# Patient Record
Sex: MALE | Race: WHITE | NOT HISPANIC OR LATINO | Employment: FULL TIME | URBAN - METROPOLITAN AREA
[De-identification: names, ages, dates, MRNs, and addresses within clinical notes are randomized per-mention and may not be internally consistent; named-entity substitution may affect disease eponyms.]

---

## 2019-10-30 ENCOUNTER — HOSPITAL ENCOUNTER (OUTPATIENT)
Dept: GENERAL RADIOLOGY | Facility: HOSPITAL | Age: 64
Discharge: HOME | End: 2019-10-30
Attending: FAMILY MEDICINE

## 2019-11-05 ENCOUNTER — HOSPITAL ENCOUNTER (OUTPATIENT)
Dept: CARDIOLOGY | Facility: HOSPITAL | Age: 64
Discharge: HOME | End: 2019-11-05
Attending: FAMILY MEDICINE

## 2019-11-12 ENCOUNTER — OFFICE VISIT CONVERTED (OUTPATIENT)
Dept: SURGERY | Facility: CLINIC | Age: 64
End: 2019-11-12
Attending: SURGERY

## 2019-11-12 ENCOUNTER — CONVERSION ENCOUNTER (OUTPATIENT)
Dept: SURGERY | Facility: CLINIC | Age: 64
End: 2019-11-12

## 2019-11-19 ENCOUNTER — HOSPITAL ENCOUNTER (OUTPATIENT)
Dept: GENERAL RADIOLOGY | Facility: HOSPITAL | Age: 64
Discharge: HOME | End: 2019-11-19
Attending: FAMILY MEDICINE

## 2019-12-03 ENCOUNTER — HOSPITAL ENCOUNTER (OUTPATIENT)
Dept: PHYSICAL THERAPY | Facility: CLINIC | Age: 64
Setting detail: RECURRING SERIES
Discharge: HOME | End: 2020-01-14
Attending: FAMILY MEDICINE

## 2019-12-30 ENCOUNTER — HOSPITAL ENCOUNTER (OUTPATIENT)
Dept: GENERAL RADIOLOGY | Facility: HOSPITAL | Age: 64
Discharge: HOME | End: 2019-12-30
Attending: FAMILY MEDICINE

## 2020-01-10 ENCOUNTER — OFFICE VISIT CONVERTED (OUTPATIENT)
Dept: ORTHOPEDIC SURGERY | Facility: CLINIC | Age: 65
End: 2020-01-10
Attending: ORTHOPAEDIC SURGERY

## 2020-01-29 ENCOUNTER — OFFICE VISIT CONVERTED (OUTPATIENT)
Dept: FAMILY MEDICINE CLINIC | Facility: CLINIC | Age: 65
End: 2020-01-29
Attending: NURSE PRACTITIONER

## 2020-02-06 ENCOUNTER — HOSPITAL ENCOUNTER (OUTPATIENT)
Dept: LAB | Facility: HOSPITAL | Age: 65
Discharge: HOME | End: 2020-02-06
Attending: NURSE PRACTITIONER

## 2020-02-06 LAB
ALBUMIN SERPL-MCNC: 4.2 G/DL (ref 3.5–5)
ALBUMIN/GLOB SERPL: 1.6 {RATIO} (ref 1.4–2.6)
ALP SERPL-CCNC: 85 U/L (ref 56–155)
ALT SERPL-CCNC: 37 U/L (ref 10–40)
ANION GAP SERPL CALC-SCNC: 19 MMOL/L (ref 8–19)
AST SERPL-CCNC: 26 U/L (ref 15–50)
BASOPHILS # BLD AUTO: 0.07 10*3/UL (ref 0–0.2)
BASOPHILS NFR BLD AUTO: 0.7 % (ref 0–3)
BILIRUB SERPL-MCNC: 0.63 MG/DL (ref 0.2–1.3)
BUN SERPL-MCNC: 15 MG/DL (ref 5–25)
BUN/CREAT SERPL: 15 {RATIO} (ref 6–20)
CALCIUM SERPL-MCNC: 9.8 MG/DL (ref 8.7–10.4)
CHLORIDE SERPL-SCNC: 100 MMOL/L (ref 99–111)
CONV ABS IMM GRAN: 0.04 10*3/UL (ref 0–0.2)
CONV CO2: 22 MMOL/L (ref 22–32)
CONV IMMATURE GRAN: 0.4 % (ref 0–1.8)
CONV TOTAL PROTEIN: 6.9 G/DL (ref 6.3–8.2)
CREAT UR-MCNC: 1.01 MG/DL (ref 0.7–1.2)
DEPRECATED RDW RBC AUTO: 40.2 FL (ref 35.1–43.9)
EOSINOPHIL # BLD AUTO: 0.37 10*3/UL (ref 0–0.7)
EOSINOPHIL # BLD AUTO: 3.5 % (ref 0–7)
ERYTHROCYTE [DISTWIDTH] IN BLOOD BY AUTOMATED COUNT: 12.3 % (ref 11.6–14.4)
GFR SERPLBLD BASED ON 1.73 SQ M-ARVRAT: >60 ML/MIN/{1.73_M2}
GLOBULIN UR ELPH-MCNC: 2.7 G/DL (ref 2–3.5)
GLUCOSE SERPL-MCNC: 156 MG/DL (ref 70–99)
HCT VFR BLD AUTO: 48.4 % (ref 42–52)
HGB BLD-MCNC: 16.4 G/DL (ref 14–18)
LYMPHOCYTES # BLD AUTO: 1.79 10*3/UL (ref 1–5)
LYMPHOCYTES NFR BLD AUTO: 17.1 % (ref 20–45)
MAGNESIUM SERPL-MCNC: 1.84 MG/DL (ref 1.6–2.3)
MCH RBC QN AUTO: 30 PG (ref 27–31)
MCHC RBC AUTO-ENTMCNC: 33.9 G/DL (ref 33–37)
MCV RBC AUTO: 88.5 FL (ref 80–96)
MONOCYTES # BLD AUTO: 1.08 10*3/UL (ref 0.2–1.2)
MONOCYTES NFR BLD AUTO: 10.3 % (ref 3–10)
NEUTROPHILS # BLD AUTO: 7.09 10*3/UL (ref 2–8)
NEUTROPHILS NFR BLD AUTO: 68 % (ref 30–85)
NRBC CBCN: 0 % (ref 0–0.7)
OSMOLALITY SERPL CALC.SUM OF ELEC: 288 MOSM/KG (ref 273–304)
PLATELET # BLD AUTO: 245 10*3/UL (ref 130–400)
PMV BLD AUTO: 11.3 FL (ref 9.4–12.4)
POTASSIUM SERPL-SCNC: 4.1 MMOL/L (ref 3.5–5.3)
RBC # BLD AUTO: 5.47 10*6/UL (ref 4.7–6.1)
SODIUM SERPL-SCNC: 137 MMOL/L (ref 135–147)
TESTOST SERPL-MCNC: 424 NG/DL (ref 193–740)
WBC # BLD AUTO: 10.44 10*3/UL (ref 4.8–10.8)

## 2020-02-11 LAB — CONV VITAMIN B6, TOTAL PLASMA: 8.8 UG/L (ref 5.3–46.7)

## 2020-02-17 ENCOUNTER — HOSPITAL ENCOUNTER (OUTPATIENT)
Dept: PERIOP | Facility: HOSPITAL | Age: 65
Setting detail: HOSPITAL OUTPATIENT SURGERY
Discharge: HOME | End: 2020-02-17
Attending: ORTHOPAEDIC SURGERY

## 2020-02-17 LAB
ANION GAP SERPL CALC-SCNC: 18 MMOL/L (ref 8–19)
BUN SERPL-MCNC: 19 MG/DL (ref 5–25)
BUN/CREAT SERPL: 18 {RATIO} (ref 6–20)
CALCIUM SERPL-MCNC: 9.8 MG/DL (ref 8.7–10.4)
CHLORIDE SERPL-SCNC: 100 MMOL/L (ref 99–111)
CONV CO2: 22 MMOL/L (ref 22–32)
CREAT UR-MCNC: 1.08 MG/DL (ref 0.7–1.2)
GFR SERPLBLD BASED ON 1.73 SQ M-ARVRAT: >60 ML/MIN/{1.73_M2}
GLUCOSE SERPL-MCNC: 159 MG/DL (ref 70–99)
OSMOLALITY SERPL CALC.SUM OF ELEC: 288 MOSM/KG (ref 273–304)
POTASSIUM SERPL-SCNC: 4.1 MMOL/L (ref 3.5–5.3)
SODIUM SERPL-SCNC: 136 MMOL/L (ref 135–147)

## 2020-03-03 ENCOUNTER — OFFICE VISIT CONVERTED (OUTPATIENT)
Dept: ORTHOPEDIC SURGERY | Facility: CLINIC | Age: 65
End: 2020-03-03
Attending: ORTHOPAEDIC SURGERY

## 2020-03-04 ENCOUNTER — HOSPITAL ENCOUNTER (OUTPATIENT)
Dept: PHYSICAL THERAPY | Facility: CLINIC | Age: 65
Setting detail: RECURRING SERIES
Discharge: HOME | End: 2020-05-01
Attending: ORTHOPAEDIC SURGERY

## 2020-04-03 ENCOUNTER — CONVERSION ENCOUNTER (OUTPATIENT)
Dept: ORTHOPEDIC SURGERY | Facility: CLINIC | Age: 65
End: 2020-04-03

## 2020-04-03 ENCOUNTER — TELEMEDICINE CONVERTED (OUTPATIENT)
Dept: ORTHOPEDIC SURGERY | Facility: CLINIC | Age: 65
End: 2020-04-03
Attending: ORTHOPAEDIC SURGERY

## 2020-04-04 ENCOUNTER — HOSPITAL ENCOUNTER (OUTPATIENT)
Dept: URGENT CARE | Facility: CLINIC | Age: 65
Discharge: HOME | End: 2020-04-04
Attending: FAMILY MEDICINE

## 2020-04-06 LAB — BACTERIA SPEC AEROBE CULT: NORMAL

## 2020-07-21 ENCOUNTER — OFFICE VISIT CONVERTED (OUTPATIENT)
Dept: SURGERY | Facility: CLINIC | Age: 65
End: 2020-07-21
Attending: SURGERY

## 2020-07-29 ENCOUNTER — OFFICE VISIT CONVERTED (OUTPATIENT)
Dept: FAMILY MEDICINE CLINIC | Facility: CLINIC | Age: 65
End: 2020-07-29
Attending: NURSE PRACTITIONER

## 2020-08-07 ENCOUNTER — HOSPITAL ENCOUNTER (OUTPATIENT)
Dept: PREADMISSION TESTING | Facility: HOSPITAL | Age: 65
Discharge: HOME | End: 2020-08-07
Attending: SURGERY

## 2020-08-08 LAB — SARS-COV-2 RNA SPEC QL NAA+PROBE: NOT DETECTED

## 2020-08-12 ENCOUNTER — HOSPITAL ENCOUNTER (OUTPATIENT)
Dept: PERIOP | Facility: HOSPITAL | Age: 65
Setting detail: HOSPITAL OUTPATIENT SURGERY
Discharge: HOME | End: 2020-08-12
Attending: SURGERY

## 2020-08-12 LAB
ANION GAP SERPL CALC-SCNC: 16 MMOL/L (ref 8–19)
BUN SERPL-MCNC: 15 MG/DL (ref 5–25)
BUN/CREAT SERPL: 13 {RATIO} (ref 6–20)
CALCIUM SERPL-MCNC: 10.8 MG/DL (ref 8.7–10.4)
CHLORIDE SERPL-SCNC: 100 MMOL/L (ref 99–111)
CONV CO2: 27 MMOL/L (ref 22–32)
CREAT UR-MCNC: 1.13 MG/DL (ref 0.7–1.2)
GFR SERPLBLD BASED ON 1.73 SQ M-ARVRAT: >60 ML/MIN/{1.73_M2}
GLUCOSE SERPL-MCNC: 183 MG/DL (ref 70–99)
OSMOLALITY SERPL CALC.SUM OF ELEC: 292 MOSM/KG (ref 273–304)
POTASSIUM SERPL-SCNC: 4.8 MMOL/L (ref 3.5–5.3)
SODIUM SERPL-SCNC: 138 MMOL/L (ref 135–147)

## 2020-08-14 ENCOUNTER — HOSPITAL ENCOUNTER (OUTPATIENT)
Dept: URGENT CARE | Facility: CLINIC | Age: 65
Discharge: HOME | End: 2020-08-14

## 2020-08-19 ENCOUNTER — HOSPITAL ENCOUNTER (OUTPATIENT)
Dept: URGENT CARE | Facility: CLINIC | Age: 65
Discharge: HOME | End: 2020-08-19
Attending: INTERNAL MEDICINE

## 2020-08-19 LAB — SARS-COV-2 RNA SPEC QL NAA+PROBE: NORMAL

## 2020-08-22 LAB — SARS-COV-2 RNA SPEC QL NAA+PROBE: NOT DETECTED

## 2020-08-25 ENCOUNTER — OFFICE VISIT CONVERTED (OUTPATIENT)
Dept: SURGERY | Facility: CLINIC | Age: 65
End: 2020-08-25
Attending: SURGERY

## 2020-08-27 ENCOUNTER — HOSPITAL ENCOUNTER (OUTPATIENT)
Dept: CARDIOLOGY | Facility: HOSPITAL | Age: 65
Discharge: HOME | End: 2020-08-27
Attending: SURGERY

## 2020-09-16 ENCOUNTER — HOSPITAL ENCOUNTER (OUTPATIENT)
Dept: CARDIOLOGY | Facility: HOSPITAL | Age: 65
Discharge: HOME | End: 2020-09-16
Attending: SURGERY

## 2020-10-02 ENCOUNTER — CONVERSION ENCOUNTER (OUTPATIENT)
Dept: OTHER | Facility: HOSPITAL | Age: 65
End: 2020-10-02

## 2020-10-02 ENCOUNTER — OFFICE VISIT CONVERTED (OUTPATIENT)
Dept: CARDIOLOGY | Facility: CLINIC | Age: 65
End: 2020-10-02
Attending: INTERNAL MEDICINE

## 2020-10-07 ENCOUNTER — CONVERSION ENCOUNTER (OUTPATIENT)
Dept: CARDIOLOGY | Facility: CLINIC | Age: 65
End: 2020-10-07
Attending: INTERNAL MEDICINE

## 2020-10-16 ENCOUNTER — OFFICE VISIT CONVERTED (OUTPATIENT)
Dept: SURGERY | Facility: CLINIC | Age: 65
End: 2020-10-16
Attending: SURGERY

## 2020-12-21 ENCOUNTER — TRANSCRIBE ORDERS (OUTPATIENT)
Dept: ADMINISTRATIVE | Facility: HOSPITAL | Age: 65
End: 2020-12-21

## 2020-12-21 DIAGNOSIS — I89.0 LYMPHEDEMA: Primary | ICD-10-CM

## 2021-01-08 ENCOUNTER — HOSPITAL ENCOUNTER (OUTPATIENT)
Dept: CT IMAGING | Facility: HOSPITAL | Age: 66
Discharge: HOME | End: 2021-01-08
Admitting: SURGERY

## 2021-01-08 DIAGNOSIS — I89.0 LYMPHEDEMA: ICD-10-CM

## 2021-01-08 PROCEDURE — 74176 CT ABD & PELVIS W/O CONTRAST: CPT

## 2021-02-01 ENCOUNTER — HOSPITAL ENCOUNTER (OUTPATIENT)
Dept: LAB | Facility: HOSPITAL | Age: 66
Discharge: HOME | End: 2021-02-01
Attending: NURSE PRACTITIONER

## 2021-02-01 ENCOUNTER — OFFICE VISIT CONVERTED (OUTPATIENT)
Dept: FAMILY MEDICINE CLINIC | Facility: CLINIC | Age: 66
End: 2021-02-01
Attending: NURSE PRACTITIONER

## 2021-02-01 LAB
ALBUMIN SERPL-MCNC: 4.3 G/DL (ref 3.5–5)
ALBUMIN/GLOB SERPL: 1.5 {RATIO} (ref 1.4–2.6)
ALP SERPL-CCNC: 112 U/L (ref 56–155)
ALT SERPL-CCNC: 47 U/L (ref 10–40)
ANION GAP SERPL CALC-SCNC: 23 MMOL/L (ref 8–19)
AST SERPL-CCNC: 31 U/L (ref 15–50)
BASOPHILS # BLD AUTO: 0.08 10*3/UL (ref 0–0.2)
BASOPHILS NFR BLD AUTO: 1.5 % (ref 0–3)
BILIRUB SERPL-MCNC: 0.56 MG/DL (ref 0.2–1.3)
BUN SERPL-MCNC: 20 MG/DL (ref 5–25)
BUN/CREAT SERPL: 17 {RATIO} (ref 6–20)
CALCIUM SERPL-MCNC: 10.1 MG/DL (ref 8.7–10.4)
CHLORIDE SERPL-SCNC: 97 MMOL/L (ref 99–111)
CHOLEST SERPL-MCNC: 193 MG/DL (ref 107–200)
CHOLEST/HDLC SERPL: 3.6 {RATIO} (ref 3–6)
CONV ABS IMM GRAN: 0.02 10*3/UL (ref 0–0.2)
CONV CO2: 18 MMOL/L (ref 22–32)
CONV IMMATURE GRAN: 0.4 % (ref 0–1.8)
CONV TOTAL PROTEIN: 7.2 G/DL (ref 6.3–8.2)
CREAT UR-MCNC: 1.16 MG/DL (ref 0.7–1.2)
DEPRECATED RDW RBC AUTO: 39.3 FL (ref 35.1–43.9)
EOSINOPHIL # BLD AUTO: 0.15 10*3/UL (ref 0–0.7)
EOSINOPHIL # BLD AUTO: 2.7 % (ref 0–7)
ERYTHROCYTE [DISTWIDTH] IN BLOOD BY AUTOMATED COUNT: 12.4 % (ref 11.6–14.4)
EST. AVERAGE GLUCOSE BLD GHB EST-MCNC: 194 MG/DL
GFR SERPLBLD BASED ON 1.73 SQ M-ARVRAT: >60 ML/MIN/{1.73_M2}
GLOBULIN UR ELPH-MCNC: 2.9 G/DL (ref 2–3.5)
GLUCOSE SERPL-MCNC: 345 MG/DL (ref 70–99)
HBA1C MFR BLD: 8.4 % (ref 3.5–5.7)
HCT VFR BLD AUTO: 45.4 % (ref 42–52)
HDLC SERPL-MCNC: 53 MG/DL (ref 40–60)
HGB BLD-MCNC: 15.7 G/DL (ref 14–18)
LDLC SERPL CALC-MCNC: 88 MG/DL (ref 70–100)
LYMPHOCYTES # BLD AUTO: 1.22 10*3/UL (ref 1–5)
LYMPHOCYTES NFR BLD AUTO: 22.3 % (ref 20–45)
MCH RBC QN AUTO: 30.1 PG (ref 27–31)
MCHC RBC AUTO-ENTMCNC: 34.6 G/DL (ref 33–37)
MCV RBC AUTO: 87 FL (ref 80–96)
MONOCYTES # BLD AUTO: 0.7 10*3/UL (ref 0.2–1.2)
MONOCYTES NFR BLD AUTO: 12.8 % (ref 3–10)
NEUTROPHILS # BLD AUTO: 3.3 10*3/UL (ref 2–8)
NEUTROPHILS NFR BLD AUTO: 60.3 % (ref 30–85)
NRBC CBCN: 0 % (ref 0–0.7)
OSMOLALITY SERPL CALC.SUM OF ELEC: 294 MOSM/KG (ref 273–304)
PLATELET # BLD AUTO: 233 10*3/UL (ref 130–400)
PMV BLD AUTO: 11.1 FL (ref 9.4–12.4)
POTASSIUM SERPL-SCNC: 4.2 MMOL/L (ref 3.5–5.3)
PSA SERPL-MCNC: 2.59 NG/ML (ref 0–4)
RBC # BLD AUTO: 5.22 10*6/UL (ref 4.7–6.1)
SODIUM SERPL-SCNC: 134 MMOL/L (ref 135–147)
TESTOST SERPL-MCNC: 110 NG/DL (ref 193–740)
TRIGL SERPL-MCNC: 260 MG/DL (ref 40–150)
TSH SERPL-ACNC: 4.06 M[IU]/L (ref 0.27–4.2)
VLDLC SERPL-MCNC: 52 MG/DL (ref 5–37)
WBC # BLD AUTO: 5.47 10*3/UL (ref 4.8–10.8)

## 2021-02-05 ENCOUNTER — TELEMEDICINE CONVERTED (OUTPATIENT)
Dept: FAMILY MEDICINE CLINIC | Facility: CLINIC | Age: 66
End: 2021-02-05
Attending: NURSE PRACTITIONER

## 2021-05-10 NOTE — H&P
History and Physical      Patient Name: Rik Zayas   Patient ID: [id number]   Sex: Male   YOB: 1955    Primary Care Provider: Carlos ADAM    Visit Date: October 2, 2020    Provider: Huey Pereyra MD   Location: [facility]   Location Address: [address] [place] [address]  [id number]          History Of Present Illness  Consult requested by: Cralos ADAM   I saw Rik Zayas in the office today. This is a 65 year old, /White male. He has no previous cardiac history. He has developed, over the last 2 to 3 years, claudication symptoms in both of his legs initially starting as fatigue and cramping in his gluteal areas and hamstrings specifically related to physical exertion and improved with rest. Over the past 6 to 8 months, his symptoms have become more involving the lower extremities in the calves. He has associated edema. Symptoms are worse with exertion and improved with rest, but at times he has cramping and rest pain at night as well. He has seen Dr. Rai here locally for evaluation for arterial insufficiency. The patient has had an BRENDA which is normal. He has had exercise arterial Dopplers, which also are normal. Due to his lower-extremity edema, he has been referred for further evaluation. The patient denies chest pain. He denies excessive shortness of breath or palpitations. He has been on stable medications. He tried Trental for a while, but this did not help his claudication symptoms.   PAST MEDICAL HISTORY: Obesity; hypertension; claudication; arthritis. PAST SURGICAL HISTORY: Knee surgery; hernia repair.   PSYCHOSOCIAL HISTORY: The patient quit smoking 10 years ago. Moderate alcohol intake. Daily caffeine intake. He is .   FAMILY HISTORY: Positive for diabetes and hypertension.   CURRENT MEDICATIONS: include Lisinopril 40 mg daily; HCTZ 25 mg daily; Amlodipine 10 mg daily; testosterone; ibuprofen 400 mg every 4 hours.  "The dosage and frequency of the medications were reviewed with the patient.   ALLERGIES: Sulfa.       Review of Systems  · Constitutional  o Admits  o : good general health lately  o Denies  o : fatigue, recent weight changes   · Eyes  o Denies  o : double vision  · HENT  o Denies  o : hearing loss or ringing, chronic sinus problem, swollen glands in neck  · Cardiovascular  o Admits  o : claudication  o Denies  o : chest pain, palpitations (fast, fluttering, or skipping beats), swelling (feet, ankles, hands), shortness of breath while walking or lying flat  · Respiratory  o Denies  o : chronic or frequent cough, asthma or wheezing, COPD  · Gastrointestinal  o Denies  o : ulcers, nausea or vomiting  · Neurologic  o Denies  o : lightheaded or dizzy, stroke, headaches  · Musculoskeletal  o Admits  o : joint pain, back pain  · Endocrine  o Denies  o : thyroid disease, diabetes, heat or cold intolerance, excessive thirst or urination  · Heme-Lymph  o Denies  o : bleeding or bruising tendency, anemia      Vitals  Date Time BP Position Site L\R Cuff Size HR RR TEMP (F) WT  HT  BMI kg/m2 BSA m2 O2 Sat FR L/min FiO2 HC       10/02/2020 01:16 /94 Sitting    83 - R   241lbs 0oz 6'  1\" 31.8 2.37       10/02/2020 01:16 /93 Sitting                       Physical Examination  · Constitutional  o Appearance  o : Normal, white male, pleasant, in no acute distress.  · Head and Face  o HEENT  o : No pallor, anicteric. Eyes normal. Moist mucous membranes.  · Neck  o Inspection/Palpation  o : Supple. No hepatosplenomegaly.  o Jugular Veins  o : No JVD. No carotid bruits.  · Respiratory  o Auscultation of Lungs  o : Clear to auscultation bilaterally. No crackles or wheezing.  · Cardiovascular  o Heart  o : S1, S2 is normally heard. No S3. No murmur, rubs, or gallops.  · Gastrointestinal  o Abdominal Examination  o : Soft, non-distended. No palpable hepatosplenomegaly. Bowel sounds heard in all four " quadrants.  · Musculoskeletal  o General  o : Normal muscle tone and strength.  · Skin and Subcutaneous Tissue  o General Inspection  o : No skin rashes.  · Extremities  o Extremities  o : Warm and well perfused. Distal pulses present. 2+ pitting edema of the lower extremities. His pedal pulses are normal.     EKG was performed in the office today.  Indication:       claudication.  Results:          sinus rhythm, normal axis and intervals, normal EKG.    His most recent labs showed normal renal function; glucose 183.  His lipid panel shows TRG of 378, , LDL 84, HDL 76.      I reviewed his vascular studies.  He has normal resting and exercise ABIs and normal arterial Dopplers.             Assessment     1.  Classic claudication symptoms over the past 2 to 3 years, progressive, worse with physical exertion, with       some resting pain - Basic evaluation for arterial insufficiency was negative with normal BRENDA, normal exercise       BRENDA and arterial Dopplers.  2.  Lower-extremity edema.  3.  Hypertension - Stable.  4.  Obesity.       Plan     Based on the patient's initial vascular evaluation, it is possible he is having venous claudication.  There does  not seem to be evidence of significant arterial insufficiency.  I think the patient needs a more dedicated   workup, including specific imaging for possible large-vein obstruction that would lead to venous claudication.     An echocardiogram will be scheduled to evaluate for any signs of right heart failure or cardiac causes of high venous pressure.  I recommend he see a vascular specialist with particular expertise in vein-related issues.  I am referring him to Dr. Diane James with [facility].  I will defer choice of imaging modality to her expertise.  I have discussed this with the patient.  He is agreeable and is happy with this referral.  For now, I have kept his medications the same.  We will call him with echocardiogram results.  I will see him  back in a few months as well for follow-up.    It is a pleasure to assist in his care.   Please let me know if you have any questions regarding his case.    Sincerely,       MARLEN Pereyra M.D.  muriel/cornelia           This note was transcribed by Elza Molina.  dmd/cbd  The above service was transcribed by Elza Molina, and I attest to the accuracy of the note.  CBD.             Electronically Signed by: Elza Molina-, -Author on October 6, 2020 11:06:45 AM  Electronically Co-signed by: Huey Pereyra MD -Reviewer on October 6, 2020 12:04:30 PM

## 2021-05-10 NOTE — H&P
"   History and Physical      Patient Name: Rik Zayas   Patient ID: [id number]   Sex: Male   YOB: 1955    Primary Care Provider: Antonio Huntley MD   Referring Provider: Antonio Huntley MD    Visit Date: July 21, 2020    Provider: Diaz Biswas MD   Location: [facility]   Location Address: [address] [place] [address]  [id number]   Location Phone: [phone]          Chief Complaint  · Outpatient History & Physical / Surgical Orders  · Hernia Consult      History Of Present Illness     Rik came in today for evaluation. He is a very nice gentleman that we have met in the past. He has an umbilical hernia that is a bit more symptomatic now. He would like to go ahead and have that repaired if at all possible.       Past Medical History  High blood pressure; Hypertension; Limb Swelling; Lumbar Spinal Stenosis         Past Surgical History  Colonoscopy; Hernia; Hernia Repair         Medication List  amlodipine 10 mg oral tablet; hydrochlorothiazide 25 mg oral tablet; lisinopril 40 mg oral tablet; pentoxifylline 400 mg oral tablet extended release; testosterone cypionate 200 mg/mL intramuscular oil         Allergy List  SULFA (SULFONAMIDES)       Allergies Reconciled  Family Medical History  Diabetes, unspecified type; -Father's Family History Unknown; -Mother's Family History Unknown         Social History  Alcohol (Current some day); Alcohol Use (Current some day); Caffeine (Current every day); lives with children; lives with spouse; .; Recreational Drug Use (Never); Second hand smoke exposure (Never); Tobacco (Never); Working         Vitals  Date Time BP Position Site L\R Cuff Size HR RR TEMP (F) WT  HT  BMI kg/m2 BSA m2 O2 Sat        07/21/2020 01:27 PM       16  240lbs 0oz 6'  1\" 31.66 2.37           Physical Examination  · Constitutional  o Appearance  o : well-nourished, well developed, alert, in no acute distress  · Head and Face  o Head  o :   § Inspection  § : " atraumatic, normocephalic  · Neck  o Inspection/Palpation  o : supple, normal range of motion  · Respiratory  o Inspection of Chest  o : normal inspection  o Auscultation of Lungs  o : breath sounds normal, no distress, clear to ascultate bilaterally  · Cardiovascular  o Heart  o :   § Auscultation of Heart  § : regular rate and rhythm, no murmur, gallop or rub  · Gastrointestinal  o Abdominal Examination  o : normal bowel sounds, non-tender, soft. Umbilical hernia noted.          Assessment  · Pre-Surgical Orders     V72.84  · Hernia, Umibilical     553.1      Plan  · Orders  o Surgery Order (GENOR) - 553.1 - 08/12/2020  o [facility] Pre-Op Covid-19 Screening (29826) - 553.1 - 08/07/2020  · Medications  o Medications have been Reconciled  o Transition of Care or Provider Policy  · Instructions  o PLAN:  o Handouts Provided-Pre-Procedure Instructions including date and time and location of procedure.  o Surgical Facility: [facility]  o ****Surgical Orders****  o ****Patient Status****  o Outpatient  o RISK AND BENEFITS:  o Consent for surgery: Given these options, the patient has verbally expressed an understanding of the risks of surgery and finds these risks acceptable. We will proceed with surgery as soon as possible.  o Consult Anesthesia for any post-operative block, or any pain management procedure deemed necessary by the anestesiologist for adequate post-operative pain control.   o O.R. PREP: Per protocol  o SCD's preoperatively  o PLEASE SIGN PERMIT FOR: Robotic umbilical hernia repair  o *__Kefzol 2 gram IV on call to OR.  o *___The above History and Physical Examination has been completed within 30 days of admission.  o Electronically Identified Patient Education Materials Provided Electronically     We will set him up for a robotic umbilical hernia repair. I have described the procedure to him as well as the risks and benefits and he is agreeable to proceeding.             Electronically Signed by:  Yuliana Osullivan-, -Author on July 22, 2020 10:05:29 AM  Electronically Co-signed by: Diaz Biswas MD -Reviewer on July 27, 2020 02:34:26 PM

## 2021-05-10 NOTE — PROCEDURES
"   Procedure Note      Patient Name: Rik Zayas   Patient ID: [id number]   Sex: Male   YOB: 1955    Primary Care Provider: Carlos ADAM    Visit Date: October 7, 2020    Provider: Huey Pereyra MD   Location: [facility]   Location Address: [address] [place] [address], [address]   [place]  [id number]   Location Phone: [phone]          FINAL REPORT   TRANSTHORACIC ECHOCARDIOGRAM REPORT    Diagnosis: Edema   Height: 6'1\" Weight: 261 B/P: 142/94 BSA: 2.3   Tech: JTW   MEASUREMENTS:  RVID (Diastole) : RVID. (NORMAL: 0.7 to 2.4 cm max)   LVID (Systole): 3.0 cm (Diastole): 4.2 cm (NORMAL: 3.7 - 5.4 cm)   Posterior Wall Thickness (Diastole): 1.0 cm (NORMAL: 0.8 - 1.1 cm)   Septal Thickness (Diastole): 1.0 cm (NORMAL: 0.7 - 1.2 cm)   LAID (Systole): 2.8 cm (NORMAL: 1.9 - 3.8 cm)   Aortic Root Diameter (Diastole): 3.8 cm (NORMAL: 2.0 - 3.7 cm)   COMMENTS:  The patient underwent 2-D, M-Mode, and Doppler examination, including pulse-wave, continuous-wave, and color-flow analysis; the study is technically adequate.   FINDINGS:  MITRAL VALVE: Normal in structure and function.   AORTIC VALVE: Mild sclerotic changes. There is no aortic stenosis. There is trace aortic regurgitation, eccentric jet.   TRICUSPID VALVE: Normal in structure and function.   PULMONIC VALVE: Not well visualized.   AORTIC ROOT: Mildly enlarged at 3.8 to 4.0 cm.   LEFT ATRIUM: Normal in size. LA volume index is 12 mL/m2.   LEFT VENTRICLE: Normal in size. Wall thickness is normal. Wall motion is low-normal globally. Ejection fraction is 50-55%. Diastolic function measurements suggest impaired relaxation, diastolic dysfunction.   RIGHT VENTRICLE: Normal in size and function.   RIGHT ATRIUM: Normal in size.   PERICARDIUM: Normal. No effusion.   INFERIOR VENA CAVA: Normal.   DOPPLER: E/A ratio is 0.7. DT= 194 msec. IVRT is 77 msec. E/E' is 9.   Faxed: 10/14/2020      CONCLUSION:    1.  Low-normal left ventricular systolic " function, ejection fraction 50-55%.  2.  Mild aortic valve sclerosis with trace aortic regurgitation.        MARLEN Pereyra MD  CBD:vm                 Electronically Signed by: Sola De La O-, Other -Author on October 14, 2020 01:03:58 PM  Electronically Co-signed by: Huey Pereyra MD -Reviewer on October 15, 2020 09:28:09 AM

## 2021-05-12 NOTE — PROGRESS NOTES
"   Quick Note      Patient Name: Rik Zayas   Patient ID: [id number]   Sex: Male   YOB: 1955    Primary Care Provider: Antonio Huntley MD   Referring Provider: Antonio Huntley MD    Visit Date: April 3, 2020    Provider: Reji Platt MD   Location: [facility]   Location Address: [address] [place] [address]  [id number]   Location Phone: [phone]          History Of Present Illness  TELEHEALTH VISIT  Chief Complaint: left knee pain   Rik aZyas is a 64 year old /White male who is presenting for evaluation via telehealth visit. Verbal consent obtained before beginning visit.   Provider spent 5 minutes with the patient during telehealth visit.   The following staff were present during this visit: Dr. Reji Platt   Past Medical History/Overview of Patient Symptoms     HPI: This is a Telehealth visit via telephone. He's overall doing well. He's having no major issues. He's returned to work. He just has some puffiness and soreness along the arthroscopy scars, but it's not requiring any pain medication. He went down the steps about a week ago and had some soreness afterwards, but this has resolved. He's overall happy with the knee. He has good range of motion and has been working on some strength.    DIAGNOSIS: S/P Left Knee Arthroscopy     PLAN: We discussed that it sounds like he's on track for recovery after knee arthroscopy. We recommend to continue activities as tolerated. Plan for follow-up in 6 weeks if needed. If symptoms worsen, he will call.       Vitals  Date Time BP Position Site L\R Cuff Size HR RR TEMP (F) WT  HT  BMI kg/m2 BSA m2 O2 Sat HC       04/03/2020 11:13 AM       16  240lbs 0oz 6'  1\" 31.66 2.37               Plan  · Instructions  o Plan Of Care:             Electronically Signed by: Angie Delgadillo - , Other -Author on April 3, 2020 02:50:18 PM  Electronically Co-signed by: Reji Platt MD -Reviewer on April 5, 2020 " 08:17:38 PM

## 2021-05-13 NOTE — PROGRESS NOTES
Progress Note      Patient Name: Rik Zayas   Patient ID: [id number]   Sex: Male   YOB: 1955    Primary Care Provider: Antonio Huntley MD   Referring Provider: Antonio Huntley MD    Visit Date: July 29, 2020    Provider: JAIR Perla   Location: [facility]   Location Address: [address] [place] [address], [address]  [id number]   Location Phone: [phone]          Chief Complaint     6 month follow up for HTN    Pentoxifylline is not helping with his legs  Requesting a walking restriction work note       History Of Present Illness  Rik Zayas is a 65 year old /White male who presents for evaluation and treatment of:      Presents to the office today for 6-month follow-up regarding his hypertension.  Blood pressure on arrival is 134/82.  He denies any chest pain shortness breath palpitations this time.  Patient states that his legs continue to cramp throughout the night.  He does state that he would like a referral to vascular as medications are not helping.  He did have an BRENDA completed a few months ago which was 1.3.    Patient is scheduled to have hernia surgery next month.  He does state that he needs refills on all his medications at this time.       Past Medical History  Disease Name Date Onset Notes   High blood pressure --  --    Hypertension --  --    Limb Swelling --  --    Lumbar Spinal Stenosis --  --          Past Surgical History  Procedure Name Date Notes   Colonoscopy --  --    Hernia --  --    Hernia Repair --  --          Medication List  Name Date Started Instructions   amlodipine 10 mg oral tablet 07/29/2020 take 1 tablet (10 mg) by oral route once daily   hydrochlorothiazide 25 mg oral tablet 07/29/2020 take 1 tablet (25 mg) by oral route once daily   lisinopril 40 mg oral tablet 07/29/2020 take 1 tablet (40 mg) by oral route once daily   testosterone cypionate 200 mg/mL intramuscular oil 07/29/2020 inject 0.25 milliliter by intramuscular  route daily         Allergy List  Allergen Name Date Reaction Notes   SULFA (SULFONAMIDES) --  --  --          Family Medical History  Disease Name Relative/Age Notes   Diabetes, unspecified type Father/  Mother/  Sister/   Mother; Father; Sister  Mother; Father   -Father's Family History Unknown Father/   Father   -Mother's Family History Unknown Mother/   Mother         Social History  Finding Status Start/Stop Quantity Notes   Alcohol Current some day --/-- --  drinks weekly; liquor   Alcohol Use Current some day --/-- --  drinks weekly, 1 drink per day, has been drinking for 31 or more years   Caffeine Current every day --/-- --  drinks regularly; coffee; 1-2 times per day   lives with children --  --/-- --  --    lives with spouse --  --/-- --  --    . --  --/-- --  --    Recreational Drug Use Never --/-- --  no   Second hand smoke exposure Never --/-- --  no   Tobacco Never --/-- --  never smoker  never a smoker   Working --  --/-- --  --          Review of Systems  · Constitutional  o Denies  o : fatigue, night sweats  · Eyes  o Denies  o : double vision, blurred vision  · HENT  o Denies  o : vertigo, recent head injury  · Breasts  o Denies  o : abnormal changes in breast size, additional breast symptoms except as noted in the HPI  · Cardiovascular  o Denies  o : chest pain, irregular heart beats  · Respiratory  o Denies  o : shortness of breath, productive cough  · Gastrointestinal  o Denies  o : nausea, vomiting  · Genitourinary  o Denies  o : dysuria, urinary retention  · Integument  o Denies  o : hair growth change, new skin lesions  · Neurologic  o Denies  o : altered mental status, seizures  · Musculoskeletal  o Denies  o : joint swelling, limitation of motion  · Endocrine  o Denies  o : cold intolerance, heat intolerance  · Heme-Lymph  o Denies  o : petechiae, lymph node enlargement or tenderness  · Allergic-Immunologic  o Denies  o : frequent illnesses      Vitals  Date Time BP Position Site  "L\R Cuff Size HR RR TEMP (F) WT  HT  BMI kg/m2 BSA m2 O2 Sat        07/29/2020 08:19 /82 Sitting    92 - R 18 97.5 238lbs 0oz 6'  1\" 31.4 2.36 98 %          Physical Examination  · Constitutional  o Appearance  o : well-nourished, in no acute distress  · Neck  o Inspection/Palpation  o : normal appearance, no masses or tenderness, trachea midline  o Range of Motion  o : cervical range of motion within normal limits  o Thyroid  o : gland size normal, nontender, no nodules or masses present on palpation  · Respiratory  o Respiratory Effort  o : breathing unlabored  o Inspection of Chest  o : normal appearance  o Auscultation of Lungs  o : normal breath sounds throughout inspiration and expiration  · Cardiovascular  o Heart  o :   § Auscultation of Heart  § : regular rate and rhythm, no murmurs, gallops or rubs  o Peripheral Vascular System  o :   § Extremities  § : no clubbing or edema  · Skin and Subcutaneous Tissue  o General Inspection  o : no rashes or lesions present, no areas of discoloration  o Body Hair  o : hair normal for age, general body hair distribution normal for age  o Digits and Nails  o : no clubbing, cyanosis, deformities or edema present, normal appearing nails  · Neurologic  o Mental Status Examination  o :   § Orientation  § : grossly oriented to person, place and time  o Gait and Station  o : normal gait, able to stand without difficulty  · Psychiatric  o Judgement and Insight  o : judgment and insight intact  o Mood and Affect  o : mood normal, affect appropriate  o Presence of Abnormal Thoughts  o : no hallucinations, no delusions present, no psychotic thoughts          Assessment  · Essential hypertension     401.9/I10  · Hypogonadism, male     257.2/E29.1  · Leg cramping     729.82/R25.2      Plan  · Orders  o Immunization Admin Fee (Single) ([facility]) (20927) - - 07/29/2020  o ACO-39: Current medications updated and reviewed () - - 07/29/2020  o ACO-14: Influenza immunization " administered or previously received () - - 07/29/2020  o VASCULAR SURGERY CONSULTATION ([facility]) - - 07/29/2020  o Prevnar 13 (33557) - - 07/29/2020   Vaccine - Prevnar 13; Dose: 0.5; Site: Right Arm; Route: Intramuscular; Date: 07/29/2020 10:06:00; Exp: 05/01/2022; Lot: [id number]; Mfg: [employer]-Praxis; TradeName: PREVNAR 13; Administered By: Iman Barone MA; Comment: Patient tolerated injection well.  · Medications  o testosterone cypionate 200 mg/mL intramuscular oil   SIG: inject 0.25 milliliter by intramuscular route daily   DISP: (1) 1 ml vial with 0 refills  Adjusted on 07/29/2020     o amlodipine 10 mg oral tablet   SIG: take 1 tablet (10 mg) by oral route once daily   DISP: (90) tablet with 1 refills  Refilled on 07/29/2020     o hydrochlorothiazide 25 mg oral tablet   SIG: take 1 tablet (25 mg) by oral route once daily   DISP: (90) tablet with 1 refills  Refilled on 07/29/2020     o lisinopril 40 mg oral tablet   SIG: take 1 tablet (40 mg) by oral route once daily   DISP: (90) tablet with 1 refills  Refilled on 07/29/2020     o pentoxifylline 400 mg oral tablet extended release   SIG: take 1 tablet (400 mg) by oral route 2 times per day with meals for 90 days   DISP: (180) tablets with 1 refills  Discontinued on 07/29/2020     o Medications have been Reconciled  o Transition of Care or Provider Policy  · Instructions  o Patient advised to monitor blood pressure (B/P) at home and journal readings. Patient informed that a B/P reading at home of more than 130/80 is considered hypertension. For readings greater xfcq291/90 or higher patient is advised to follow up in the office with readings for management. Patient advised to limit sodium intake.  o Patient was educated/instructed on their diagnosis, treatment and medications prior to discharge from the clinic today.  o Time spent with the patient was minutes, more than 50% face to face.  o Electronically Identified Patient Education Materials  Provided Electronically  · Disposition  o Call or Return if symptoms worsen or persist.  o Care Transition  o DONATO Sent            Electronically Signed by: JAIR Perla -Author on July 29, 2020 10:14:09 AM

## 2021-05-13 NOTE — PROGRESS NOTES
"   Progress Note      Patient Name: Rik Zayas   Patient ID: [id number]   Sex: Male   YOB: 1955    Primary Care Provider: Carlos ADAM    Visit Date: August 25, 2020    Provider: Diaz Biswas MD   Location: [facility]   Location Address: [address] [place] [address]  [id number]   Location Phone: [phone]          Chief Complaint  · Follow Up Office Visit      History Of Present Illness     Mr. Zayas came back for follow-up. He is doing well following a robotic umbilical hernia repair. He is without complaints today.       Past Medical History  High blood pressure; Hypertension; Limb Swelling; Lumbar Spinal Stenosis         Past Surgical History  Colonoscopy; Hernia; Hernia Repair         Medication List  amlodipine 10 mg oral tablet; hydrochlorothiazide 25 mg oral tablet; lisinopril 40 mg oral tablet; testosterone cypionate 200 mg/mL intramuscular oil         Allergy List  SULFA (SULFONAMIDES)         Family Medical History  Diabetes, unspecified type; -Father's Family History Unknown; -Mother's Family History Unknown         Social History  Alcohol (Current some day); Alcohol Use (Current some day); Caffeine (Current every day); lives with children; lives with spouse; .; Recreational Drug Use (Never); Second hand smoke exposure (Never); Tobacco (Never); Working         Immunizations  Name Date Admin   Prevnar 13          Review of Systems  · Cardiovascular  o Denies  o : chest pain, irregular heart beats, rapid heart rate, chest pain on exertion, shortness of breath, lower extremity swelling  · Respiratory  o Denies  o : shortness of breath, wheezing, cough, wheezing, chronic cough, coughing up blood  · Gastrointestinal  o Denies  o : nausea, vomiting, diarrhea, chronic abdominal pain, reflux symptoms      Vitals  Date Time BP Position Site L\R Cuff Size HR RR TEMP (F) WT  HT  BMI kg/m2 BSA m2 O2 Sat HC       08/25/2020 01:33 PM       18  238lbs 0oz 6'  1\" 31.4 2.36  "          Physical Examination     Today on physical exam, his incisions look good. His abdomen is soft. He does have some fluid out in his hernia sac but it doesn't look too bad.           Assessment  · Postoperative Exam Following Surgery     V67.00       Doing well status post robotic umbilical hernia repair.       Plan  · Medications  o Medications have been Reconciled  o Transition of Care or Provider Policy     We will see him back on an as needed basis.             Electronically Signed by: Yuliana Osullivan-, -Author on August 26, 2020 10:28:12 AM  Electronically Co-signed by: Diaz Biswas MD -Reviewer on August 28, 2020 09:17:49 AM

## 2021-05-13 NOTE — PROGRESS NOTES
Progress Note      Patient Name: Rik Zayas   Patient ID: [id number]   Sex: Male   YOB: 1955    Primary Care Provider: Carlos ADAM    Visit Date: October 16, 2020    Provider: Diaz Biswas MD   Location: [facility]   Location Address: [address] [place] [address]  [id number]   Location Phone: [phone]          Chief Complaint  · Follow Up Office Visit      History Of Present Illness     Mr. Zayas came back for follow-up. He is doing okay following a laparoscopic umbilical hernia repair. He is still having a fair amount of fluid in his hernia sac. It is still a bit uncomfortable.       Past Medical History  High blood pressure; Hypertension; Limb Swelling; Lumbar Spinal Stenosis         Past Surgical History  Colonoscopy; Hernia; Hernia Repair         Medication List  amlodipine 10 mg oral tablet; hydrochlorothiazide 25 mg oral tablet; lisinopril 40 mg oral tablet; testosterone cypionate 200 mg/mL intramuscular oil         Allergy List  SULFA (SULFONAMIDES)         Family Medical History  Diabetes, unspecified type; -Father's Family History Unknown; -Mother's Family History Unknown         Social History  Alcohol (Current some day); Alcohol Use (Current some day); Caffeine (Current every day); lives with children; lives with spouse; .; Recreational Drug Use (Never); Second hand smoke exposure (Never); Tobacco (Never); Working         Immunizations  Name Date Admin   Prevnar 13 07/29/2020         Review of Systems  · Cardiovascular  o Denies  o : chest pain, irregular heart beats, rapid heart rate, chest pain on exertion, shortness of breath, lower extremity swelling  · Respiratory  o Denies  o : shortness of breath, wheezing, cough, wheezing, chronic cough, coughing up blood  · Gastrointestinal  o Denies  o : nausea, vomiting, diarrhea, chronic abdominal pain, reflux symptoms      Vitals  Date Time BP Position Site L\R Cuff Size HR RR TEMP (F) WT  HT   "BMI kg/m2 BSA m2 O2 Sat FR L/min FiO2 HC       10/16/2020 09:14 AM       16  241lbs 0oz 6'  1\" 31.8 2.37             Physical Examination     Today on physical exam, he still has a sizeable seroma there.           Assessment  · Postoperative Exam Following Surgery     V67.00       Status post recent laparoscopic umbilical hernia repair. He still has a fair amount of seroma fluid a couple of months after surgery.       Plan  · Medications  o Medications have been Reconciled  o Transition of Care or Provider Policy     I have reassured Mr. Zayas that I have told him it is likely this will get better if we give him a little bit more time but that if he were a couple of months further down the road and if it was any better, I have asked him to give me a call. I did tell him rarely we might have to remove a hernia sac. He indicated that he would give me a call if that was not better by that time. I will see him back on an as needed basis.             Electronically Signed by: Yuliana Osullivan-, -Author on October 16, 2020 12:31:57 PM  Electronically Co-signed by: Diaz Biswas MD -Reviewer on October 19, 2020 04:24:43 PM  "

## 2021-05-14 VITALS — HEIGHT: 73 IN | BODY MASS INDEX: 31.54 KG/M2 | RESPIRATION RATE: 18 BRPM | WEIGHT: 238 LBS

## 2021-05-14 VITALS
BODY MASS INDEX: 31.94 KG/M2 | WEIGHT: 241 LBS | HEART RATE: 83 BPM | DIASTOLIC BLOOD PRESSURE: 94 MMHG | HEIGHT: 73 IN | SYSTOLIC BLOOD PRESSURE: 142 MMHG

## 2021-05-14 VITALS
BODY MASS INDEX: 31.41 KG/M2 | OXYGEN SATURATION: 98 % | WEIGHT: 237 LBS | DIASTOLIC BLOOD PRESSURE: 80 MMHG | SYSTOLIC BLOOD PRESSURE: 138 MMHG | HEART RATE: 94 BPM | TEMPERATURE: 97.4 F | HEIGHT: 73 IN

## 2021-05-14 VITALS — RESPIRATION RATE: 16 BRPM | HEIGHT: 73 IN | BODY MASS INDEX: 31.94 KG/M2 | WEIGHT: 241 LBS

## 2021-05-14 NOTE — PROGRESS NOTES
Progress Note      Patient Name: Rik Zayas   Patient ID: [id number]   Sex: Male   YOB: 1955    Primary Care Provider: Carlos ADAM    Visit Date: February 1, 2021    Provider: JAIR Perla   Location: [facility]   Location Address: [address] [place] [address], [address] [place] [address]  [id number]   Location Phone: [phone]          Chief Complaint  · Sinus pain and pressure  · Wants labs   · Would like a prostate exam      History Of Present Illness  Rik Zayas is a 65 year old /White male who presents for evaluation and treatment of:      Patient presents to the office today for a checkup.  Patient states that he is needing his labs completed.  He does state that he has concerns due to him having a weak urine stream as well as urinary frequency.  Patient also states that he has nocturia.    Patient also complains of sinus pressure and congestion for approximately 2 months.  He states that he has tried his histamines and his Vira pot without any resolution of symptoms.       Past Medical History  Disease Name Date Onset Notes   High blood pressure --  --    Hypertension --  --    Limb Swelling --  --    Lumbar Spinal Stenosis --  --          Past Surgical History  Procedure Name Date Notes   Colonoscopy --  --    Hernia --  --    Hernia Repair --  --          Medication List  Name Date Started Instructions   amlodipine 10 mg oral tablet 07/29/2020 take 1 tablet (10 mg) by oral route once daily   hydrochlorothiazide 25 mg oral tablet 07/29/2020 take 1 tablet (25 mg) by oral route once daily   lisinopril 40 mg oral tablet 07/29/2020 take 1 tablet (40 mg) by oral route once daily   testosterone cypionate 200 mg/mL intramuscular oil 07/29/2020 inject 0.25 milliliter by intramuscular route daily         Allergy List  Allergen Name Date Reaction Notes   SULFA (SULFONAMIDES) --  --  --          Family Medical History  Disease Name Relative/Age Notes  "  Diabetes, unspecified type Father/  Mother/  Sister/   Mother; Father; Sister  Mother; Father   -Father's Family History Unknown Father/   Father   -Mother's Family History Unknown Mother/   Mother         Social History  Finding Status Start/Stop Quantity Notes   Alcohol Current some day --/-- --  drinks weekly; liquor   Alcohol Use Current some day --/-- --  drinks weekly, 1 drink per day, has been drinking for 31 or more years   Caffeine Current every day --/-- --  drinks regularly; coffee; 1-2 times per day   lives with children --  --/-- --  --    lives with spouse --  --/-- --  --    . --  --/-- --  --    Recreational Drug Use Never --/-- --  no   Second hand smoke exposure Never --/-- --  no   Tobacco Never --/-- --  never smoker  never a smoker   Working --  --/-- --  --          Immunizations  NameDate Admin Mfg Trade Name Lot Number Route Inj VIS Given VIS Publication   Influenza10/14/2020 SKB Fluarix, quadrivalent, preservative free [id number] NE NE 02/01/2021    Comments:    Prevnar 1307/29/2020 [facility] PREVNAR 13 [id number] [facility] 07/29/2020    Comments: Patient tolerated injection well.         Review of Systems  · Constitutional  o Denies  o : fever, fatigue, weight loss, weight gain  · Cardiovascular  o Denies  o : lower extremity edema, claudication, chest pressure, palpitations  · Respiratory  o Denies  o : shortness of breath, wheezing, cough, hemoptysis, dyspnea on exertion  · Gastrointestinal  o Denies  o : nausea, vomiting, diarrhea, constipation, abdominal pain  · Allergic-Immunologic  o Admits  o : sinus allergy symptoms  o Denies  o : frequent illnesses      Vitals  Date Time BP Position Site L\R Cuff Size HR RR TEMP (F) WT  HT  BMI kg/m2 BSA m2 O2 Sat FR L/min FiO2 HC       02/01/2021 08:21 /80 Sitting    94 - R  97.4 237lbs 0oz 6'  1\" 31.27 2.35 98 %            Physical Examination  · Constitutional  o Appearance  o : well-nourished, in no acute distress  · Eyes  o Conjunctivae  o : " conjunctivae normal  o Sclerae  o : sclerae white  o Pupils and Irises  o : pupils equal and round  o Eyelids/Ocular Adnexae  o : eyelid appearance normal, no exudates present  · Ears, Nose, Mouth and Throat  o Ears  o :   § External Ears  § : external auditory canal appearance within normal limits, no discharge present  § Otoscopic Examination  § : tympanic membrane appearance within normal limits bilaterally, cerumen not present  o Nose  o :   § External Nose  § : appearance normal  § Intranasal Exam  § : maxillary sinus tender to exam by percussion   § Nasopharynx  § : no discharge present  o Oral Cavity  o :   § Oral Mucosa  § : oral mucosa normal  § Lips  § : lip appearance normal  § Teeth  § : normal dentation for age  o Throat  o :   § Oropharynx  § : no inflammation or lesions present, tonsils within normal limits  · Neck  o Inspection/Palpation  o : normal appearance, no masses or tenderness, trachea midline  o Range of Motion  o : cervical range of motion within normal limits  o Thyroid  o : gland size normal, nontender, no nodules or masses present on palpation  · Respiratory  o Respiratory Effort  o : breathing unlabored  o Inspection of Chest  o : normal appearance  o Auscultation of Lungs  o : normal breath sounds throughout inspiration and expiration  · Cardiovascular  o Heart  o :   § Auscultation of Heart  § : regular rate and rhythm, no murmurs, gallops or rubs  o Peripheral Vascular System  o :   § Extremities  § : no clubbing or edema  · Genitourinary  o Digital Rectal Examination  o :   § Tone and Masses  § : normal sphincter tone, no rectal masses present, no rectal or anal bleeding present  § Prostate  § : nontender to palpation, enlarged prostate present, no nodules present  · Lymphatic  o Neck  o : no lymphadenopathy present  · Musculoskeletal  o Spine  o :   § Inspection/Palpation  § : no spinal tenderness, scoliosis or kyphosis present  § Range of Motion  § : spine range of motion  normal  o Right Upper Extremity  o :   § Inspection/Palpation  § : no tenderness to palpation, ROM normal  o Left Upper Extremity  o :   § Inspection/Palpation  § : no tenderness to palpation, ROM normal  o Right Lower Extremity  o :   § Inspection/Palpation  § : no joint or limb tenderness to palpation, ROM normal  o Left Lower Extremity  o :   § Inspection/Palpation  § : no joint or limb tenderness to palpation, ROM normal  · Skin and Subcutaneous Tissue  o General Inspection  o : no rashes or lesions present, no areas of discoloration  o Body Hair  o : hair normal for age, general body hair distribution normal for age  o Digits and Nails  o : no clubbing, cyanosis, deformities or edema present, normal appearing nails  · Neurologic  o Mental Status Examination  o :   § Orientation  § : grossly oriented to person, place and time  o Gait and Station  o : normal gait, able to stand without difficulty  · Psychiatric  o Judgement and Insight  o : judgment and insight intact  o Mood and Affect  o : mood normal, affect appropriate  o Presence of Abnormal Thoughts  o : no hallucinations, no delusions present, no psychotic thoughts          Assessment  · Screening for lipid disorders     V77.91/Z13.220  · Screening for prostate cancer     V76.44/Z12.5  · Essential hypertension     401.9/I10  · Hypogonadism, male     257.2/E29.1  · Impaired fasting glucose     790.21/R73.01  · Sinusitis, acute     461.9/J01.90  · Nocturia     788.43/R35.1      Plan  · Orders  o PSA Ultrasensitive, ANNUAL SCREENING [facility] ([id number], ) - V76.44/Z12.5 - 02/01/2021  o Hgb A1c [facility] ([id number]) - - 02/01/2021  o Physical, Primary Care Panel (CBC, CMP, Lipid, TSH) [facility] ([id number], [address]134, 59132, 01653) - - 02/01/2021  o ACO-39: Current medications updated and reviewed (1159F, ) - - 02/01/2021  o Testosterone (Total) (51615) - - 02/01/2021  · Medications  o Augmentin 875-125 mg oral tablet   SIG: take 1 tablet by oral route 2 times a day for 10 days    DISP: (20) Tablet with 0 refills  Prescribed on 02/01/2021     o amlodipine 10 mg oral tablet   SIG: take 1 tablet (10 mg) by oral route once daily   DISP: (90) Tablet with 1 refills  Refilled on 02/01/2021     o hydrochlorothiazide 25 mg oral tablet   SIG: take 1 tablet (25 mg) by oral route once daily   DISP: (90) Tablet with 1 refills  Refilled on 02/01/2021     o lisinopril 40 mg oral tablet   SIG: take 1 tablet (40 mg) by oral route once daily   DISP: (90) Tablet with 1 refills  Refilled on 02/01/2021     o Medications have been Reconciled  o Transition of Care or Provider Policy  · Instructions  o Patient advised to monitor blood pressure (B/P) at home and journal readings. Patient informed that a B/P reading at home of more than 130/80 is considered hypertension. For readings greater evcg494/90 or higher patient is advised to follow up in the office with readings for management. Patient advised to limit sodium intake.  o Patient was educated/instructed on their diagnosis, treatment and medications prior to discharge from the clinic today.  o Minutes spent with patient including greater than 50% in Education/Counseling/Care Coordination.  o Time spent with the patient was minutes, more than 50% face to face.  o Electronically Identified Patient Education Materials Provided Electronically  · Disposition  o Call or Return if symptoms worsen or persist.  o Follow up as scheduled            Electronically Signed by: JAIR Perla -Author on February 1, 2021 09:20:14 AM

## 2021-05-14 NOTE — PROGRESS NOTES
Progress Note      Patient Name: Rik Zayas   Patient ID: [id number]   Sex: Male   YOB: 1955    Primary Care Provider: Carlos ADAM    Visit Date: February 5, 2021    Provider: JAIR Perla   Location: [facility]   Location Address: [address] [place] [address], [address]  [id number]   Location Phone: [phone]          History Of Present Illness  TELEHEALTH TELEPHONE VISIT  Rik Zayas is a 65 year old /White male who is presenting for evaluation via telehealth telephone visit. Verbal consent obtained before beginning visit.   Provider spent 12 minutes with patient during telehealth visit.   The following staff were present during this visit: Traci Milligan MA   Past Medical History/Overview of Patient Symptoms  Rik Zayas is a 65 year old /White male who presents for evaluation and treatment of:      Telehealth visit completed for the patient to review his recent lab work.  Did review his glucose levels of 345 as well as his A1c of 8.4%.  This is a new onset diabetes.  We did discuss dietary considerations including decreased sugars as well as carbohydrates to help lower his A1c.  I also discussed medications to start.  We will repeat his labs in 3 months to see how active this regimen is.  Patient is no longer taking his testosterone injections.  He did have concerns over his frequent urination.  I did explain that this was probably likely due to his diabetes.  Patient does want to hold off on his testosterone replacement therapy at this time.  PSA was unremarkable.       Review of Systems  · Constitutional  o Denies  o : fever, fatigue, weight loss, weight gain  · Cardiovascular  o Denies  o : lower extremity edema, claudication, chest pressure, palpitations  · Respiratory  o Denies  o : shortness of breath, wheezing, cough, hemoptysis, dyspnea on exertion  · Gastrointestinal  o Denies  o : nausea,  vomiting, diarrhea, constipation, abdominal pain          Assessment  · Diabetes mellitus, type 2     250.00/E11.9      Plan  · Orders  o Diabetes 2 Panel (Urine Microalbumin, CMP, Lipid, A1c, ) [facility] (14100, 35558, 89713, [address]4627) - 250.00/E11.9 - 05/05/2021  o Physician Telephone Evaluation, 11-20 minutes (85606) - - 02/05/2021  o ACO-39: Current medications updated and reviewed (1159F, ) - - 02/05/2021  · Medications  o Synjardy XR 5-1,000 mg oral tablet, IR - ER, biphasic 24hr   SIG: take 1 tablet by oral route once daily in the morning with a meal   DISP: (90) Tablet with 1 refills  Prescribed on 02/05/2021     o testosterone cypionate 200 mg/mL intramuscular oil   SIG: inject 0.25 milliliter by intramuscular route daily   DISP: (1) 1 ml vial with 0 refills  Discontinued on 02/05/2021     o Medications have been Reconciled  o Transition of Care or Provider Policy  · Instructions  o Plan Of Care:   o Minutes spent with patient including greater than 50% in Education/Counseling/Care Coordination.            Electronically Signed by: JAIR Perla -Author on February 5, 2021 12:18:38 PM

## 2021-05-15 VITALS
TEMPERATURE: 97.5 F | BODY MASS INDEX: 31.54 KG/M2 | DIASTOLIC BLOOD PRESSURE: 82 MMHG | OXYGEN SATURATION: 98 % | HEART RATE: 92 BPM | RESPIRATION RATE: 18 BRPM | HEIGHT: 73 IN | SYSTOLIC BLOOD PRESSURE: 134 MMHG | WEIGHT: 238 LBS

## 2021-05-15 VITALS
DIASTOLIC BLOOD PRESSURE: 84 MMHG | OXYGEN SATURATION: 96 % | TEMPERATURE: 97 F | RESPIRATION RATE: 16 BRPM | BODY MASS INDEX: 31.81 KG/M2 | WEIGHT: 240 LBS | HEART RATE: 84 BPM | SYSTOLIC BLOOD PRESSURE: 132 MMHG | HEIGHT: 73 IN

## 2021-05-15 VITALS — RESPIRATION RATE: 16 BRPM | WEIGHT: 230 LBS | HEIGHT: 73 IN | BODY MASS INDEX: 30.48 KG/M2

## 2021-05-15 VITALS — RESPIRATION RATE: 16 BRPM | WEIGHT: 240 LBS | BODY MASS INDEX: 31.81 KG/M2 | HEIGHT: 73 IN

## 2021-05-15 VITALS — HEIGHT: 73 IN | RESPIRATION RATE: 16 BRPM | BODY MASS INDEX: 31.54 KG/M2 | WEIGHT: 238 LBS

## 2021-05-15 VITALS — BODY MASS INDEX: 31.81 KG/M2 | HEIGHT: 73 IN | WEIGHT: 240 LBS | RESPIRATION RATE: 16 BRPM

## 2021-05-15 VITALS — HEIGHT: 73 IN | RESPIRATION RATE: 16 BRPM | WEIGHT: 240 LBS | BODY MASS INDEX: 31.81 KG/M2

## 2022-04-07 ENCOUNTER — TRANSCRIBE ORDERS (OUTPATIENT)
Dept: ADMINISTRATIVE | Facility: HOSPITAL | Age: 67
End: 2022-04-07

## 2022-04-07 DIAGNOSIS — J32.9 CHRONIC SINUSITIS, UNSPECIFIED LOCATION: Primary | ICD-10-CM

## 2022-04-27 ENCOUNTER — HOSPITAL ENCOUNTER (OUTPATIENT)
Dept: CT IMAGING | Facility: HOSPITAL | Age: 67
Discharge: HOME | End: 2022-04-27
Admitting: NURSE PRACTITIONER

## 2022-04-27 DIAGNOSIS — J32.9 CHRONIC SINUSITIS, UNSPECIFIED LOCATION: ICD-10-CM

## 2022-04-27 PROCEDURE — 70486 CT MAXILLOFACIAL W/O DYE: CPT

## 2022-05-31 ENCOUNTER — OFFICE VISIT (OUTPATIENT)
Dept: OTOLARYNGOLOGY | Facility: CLINIC | Age: 67
End: 2022-05-31

## 2022-05-31 VITALS — BODY MASS INDEX: 29.88 KG/M2 | TEMPERATURE: 97.2 F | WEIGHT: 220.6 LBS | HEIGHT: 72 IN

## 2022-05-31 DIAGNOSIS — J01.00 SUBACUTE MAXILLARY SINUSITIS: ICD-10-CM

## 2022-05-31 DIAGNOSIS — J30.1 SEASONAL ALLERGIC RHINITIS DUE TO POLLEN: Primary | ICD-10-CM

## 2022-05-31 PROCEDURE — 99204 OFFICE O/P NEW MOD 45 MIN: CPT | Performed by: OTOLARYNGOLOGY

## 2022-05-31 RX ORDER — TESTOSTERONE CYPIONATE 200 MG/ML
INJECTION, SOLUTION INTRAMUSCULAR
COMMUNITY
Start: 2022-05-09

## 2022-05-31 RX ORDER — AMLODIPINE BESYLATE 10 MG/1
10 TABLET ORAL DAILY
COMMUNITY
Start: 2022-04-25

## 2022-05-31 RX ORDER — ERGOCALCIFEROL 1.25 MG/1
CAPSULE ORAL
COMMUNITY
Start: 2022-05-27

## 2022-05-31 RX ORDER — FLUTICASONE PROPIONATE 50 MCG
2 SPRAY, SUSPENSION (ML) NASAL DAILY
Qty: 16 G | Refills: 6 | Status: SHIPPED | OUTPATIENT
Start: 2022-05-31

## 2022-05-31 RX ORDER — ROSUVASTATIN CALCIUM 40 MG/1
40 TABLET, COATED ORAL DAILY
COMMUNITY
Start: 2022-03-28

## 2022-05-31 RX ORDER — IBUPROFEN 200 MG
TABLET ORAL
COMMUNITY

## 2022-05-31 NOTE — PROGRESS NOTES
Patient Name: Rik Zayas   Visit Date: 05/31/2022   Patient ID: [id number]  Provider: Donte Gan MD    Sex: male  Location: [facility]   YOB: 1955  Location Address: [address] [place] [address], [address],?[place]?[address]    Primary Care Provider Ashly Abdi APRN  Location Phone: [phone]    Referring Provider: JAIR Rowan        Chief Complaint  polyp of sinus  (New patient  +deviated nasal septum recurrent sinusitis )    Subjective    History of Present Illness  Rik Zayas is a 67 y.o. male who presents to [facility] today as a consult from JAIR Rowan.    He presents to the clinic today for evaluation of allergic rhinitis and sinusitis.  He informs me that he has had about 4 episodes of sinusitis per year and has been on multiple antibiotic courses for this. He has a long history of allergic rhinitis and was previously on allergy shots and Flonase.  He is currently not using any nasal saline irrigations or steroid spray.  Denies any nasal obstruction issues currently.  CT scan of the sinuses was obtained and I reviewed the results with him.  This shows clear frontal sinus and sphenoid sinus.  The left ethmoid sinus has some mild inflammation.  The maxillary sinuses have some mucosal inflammation and either mucous retention cyst or small polyps throughout.  None of these polyps are obstructing.  Mildly deviated nasal septum.    Past Medical History:   Diagnosis Date   • Diabetes mellitus (HCC)    • Hypertension        Past Surgical History:   Procedure Laterality Date   • HERNIA REPAIR     • KNEE SURGERY           Current Outpatient Medications:   •  amLODIPine (NORVASC) 10 MG tablet, Take 10 mg by mouth Daily., Disp: , Rfl:   •  Dulaglutide (Trulicity) 1.5 MG/0.5ML solution pen-injector, Inject  under the skin into the appropriate area as directed., Disp: , Rfl:   •  empagliflozin (JARDIANCE) 10  "MG tablet tablet, Take  by mouth Daily., Disp: , Rfl:   •  hydroCHLOROthiazide (HYDRODIURIL) 25 MG tablet, Take 25 mg by mouth Daily., Disp: , Rfl:   •  lisinopril (PRINIVIL,ZESTRIL) 40 MG tablet, Take 40 mg by mouth Daily., Disp: , Rfl:   •  rosuvastatin (CRESTOR) 40 MG tablet, Take 40 mg by mouth Daily., Disp: , Rfl:   •  amLODIPine-benazepril (LOTREL) 10-20 MG per capsule, Take 1 capsule by mouth Daily., Disp: , Rfl:   •  fluticasone (Flonase) 50 MCG/ACT nasal spray, 2 sprays into the nostril(s) as directed by provider Daily. Administer 2 sprays in each nostril for each dose., Disp: 16 g, Rfl: 6  •  ibuprofen (ADVIL,MOTRIN) 200 MG tablet, Take two as needed, Disp: , Rfl:   •  Ibuprofen 200 MG capsule, , Disp: , Rfl:   •  Testosterone Cypionate (DEPOTESTOTERONE CYPIONATE) 200 MG/ML injection, INJECT 0.3 ML INTO THE MUSCLE ONCE WEEKLY, Disp: , Rfl:   •  vitamin D (ERGOCALCIFEROL) 1.25 MG (75676 UT) capsule capsule, , Disp: , Rfl:      Allergies   Allergen Reactions   • Sulfa Antibiotics Rash       Family History   Problem Relation Age of Onset   • Diabetes Mother    • Diabetes Father         Social History     Social History Narrative   • Not on file       Objective     Vital Signs:   Temp 97.2 °F (36.2 °C) (Tympanic)   Ht 182.9 cm (72\")   Wt 100 kg (220 lb 9.6 oz)   BMI 29.92 kg/m²       Physical Exam         Constitutional   Appearance  · : well developed, well-nourished, alert and in no acute distress, voice clear and strong    Head  Inspection  · : no deformities or lesions  Face  Inspection  · : No facial lesions; House-Brackmann I/VI bilaterally  Palpation  · : No TMJ crepitus nor  muscle tenderness bilaterally    Eyes  Vision  Visual Fields  · : Extraocular movements are intact. No spontaneous or gaze-induced nystagmus.  Conjunctivae  · : clear  Sclerae  · : clear  Pupils and Irises  · : pupils equal, round, and reactive to light.     Ears, Nose, Mouth and Throat    Ears    External Ears  · : " appearance within normal limits, no lesions present  Otoscopic Examination  · : Tympanic membrane appearance within normal limits bilaterally without perforations, well-aerated middle ears  Hearing  · : intact to conversational voice both ears  Tunning fork testing:     :    Nose    External Nose  · : appearance normal  Intranasal Exam  · : mucosa mildly congested, vestibules normal, no intranasal lesions present, septum midline, sinuses non tender to percussion  Oral Cavity    Oral Mucosa  · : oral mucosa normal without pallor or cyanosis  Lips  · : lip appearance normal  Teeth  · : normal dentition for age  Gums  · : gums pink, non-swollen, no bleeding present  Tongue  · : tongue appearance normal; normal mobility  Palate  · : hard palate normal, soft palate appearance normal with symmetric mobility    Throat    Oropharynx  · : no inflammation or lesions present, tonsils within normal limits  Hypopharynx  · : appearance within normal limits, superior epiglottis within normal limits  Larynx  · : appearance within normal limits, vocal cords within normal limits, no lesions present    Neck  Inspection/Palpation  · : normal appearance, no masses or tenderness, trachea midline; thyroid size normal, nontender, no nodules or masses present on palpation    Respiratory  Respiratory Effort  · : breathing unlabored  Inspection of Chest  · : normal appearance, no retractions    Cardiovascular  Heart  · : regular rate and rhythm    Lymphatic  Neck  · : no lymphadenopathy present  Supraclavicular Nodes  · : no lymphadenopathy present  Preauricular Nodes  · : no lymphadenopathy present    Skin and Subcutaneous Tissue  General Inspection  · : Regarding face and neck - there are no rashes present, no lesions present, and no areas of discoloration    Neurologic  Cranial Nerves  · : cranial nerves II-XII are grossly intact bilaterally  Gait and Station  · : normal gait, able to stand without diffculty    Psychiatric  Judgement and  Insight  · : judgment and insight intact  Mood and Affect  · : mood normal, affect appropriate        Assessment and Plan    Diagnoses and all orders for this visit:    1. Seasonal allergic rhinitis due to pollen (Primary)  -     fluticasone (Flonase) 50 MCG/ACT nasal spray; 2 sprays into the nostril(s) as directed by provider Daily. Administer 2 sprays in each nostril for each dose.  Dispense: 16 g; Refill: 6    2. Subacute maxillary sinusitis  -     fluticasone (Flonase) 50 MCG/ACT nasal spray; 2 sprays into the nostril(s) as directed by provider Daily. Administer 2 sprays in each nostril for each dose.  Dispense: 16 g; Refill: 6    Examination today revealed mildly congested nasal mucosa with no evidence of purulent infection or significant inflammation.  I discussed the CT scan results with him in detail, and I see no evidence of anatomical abnormality or acute issues.  The mucosal polyposis within the maxillary sinuses does not seem to be causing any obstruction or significant issues.  I think he would benefit from medical management and I discussed nasal saline irrigations and prescribed Flonase for him to use on a nightly basis.  If he has any further issues I have asked that he contact me for further evaluation.    Follow Up   No follow-ups on file.  Patient was given instructions and counseling regarding his condition or for health maintenance advice. Please see specific information pulled into the AVS if appropriate.

## 2024-11-06 ENCOUNTER — TELEPHONE (OUTPATIENT)
Dept: FAMILY MEDICINE CLINIC | Facility: CLINIC | Age: 69
End: 2024-11-06

## 2024-11-06 ENCOUNTER — OFFICE VISIT (OUTPATIENT)
Dept: FAMILY MEDICINE CLINIC | Facility: CLINIC | Age: 69
End: 2024-11-06
Payer: COMMERCIAL

## 2024-11-06 VITALS
SYSTOLIC BLOOD PRESSURE: 120 MMHG | HEIGHT: 72 IN | HEART RATE: 83 BPM | TEMPERATURE: 99.2 F | OXYGEN SATURATION: 97 % | WEIGHT: 218.8 LBS | BODY MASS INDEX: 29.64 KG/M2 | DIASTOLIC BLOOD PRESSURE: 72 MMHG

## 2024-11-06 DIAGNOSIS — N40.1 BPH ASSOCIATED WITH NOCTURIA: ICD-10-CM

## 2024-11-06 DIAGNOSIS — M17.12 PRIMARY OSTEOARTHRITIS OF LEFT KNEE: ICD-10-CM

## 2024-11-06 DIAGNOSIS — E78.00 PURE HYPERCHOLESTEROLEMIA: ICD-10-CM

## 2024-11-06 DIAGNOSIS — I10 PRIMARY HYPERTENSION: ICD-10-CM

## 2024-11-06 DIAGNOSIS — Z23 NEED FOR INFLUENZA VACCINATION: ICD-10-CM

## 2024-11-06 DIAGNOSIS — E29.1 HYPOGONADISM IN MALE: ICD-10-CM

## 2024-11-06 DIAGNOSIS — Z11.59 NEED FOR HEPATITIS C SCREENING TEST: ICD-10-CM

## 2024-11-06 DIAGNOSIS — N52.9 ERECTILE DYSFUNCTION, UNSPECIFIED ERECTILE DYSFUNCTION TYPE: ICD-10-CM

## 2024-11-06 DIAGNOSIS — R35.1 BPH ASSOCIATED WITH NOCTURIA: ICD-10-CM

## 2024-11-06 DIAGNOSIS — E78.5 TYPE 2 DIABETES MELLITUS WITH HYPERLIPIDEMIA: Primary | ICD-10-CM

## 2024-11-06 DIAGNOSIS — I89.0 LYMPHEDEMA: ICD-10-CM

## 2024-11-06 DIAGNOSIS — E11.69 TYPE 2 DIABETES MELLITUS WITH HYPERLIPIDEMIA: Primary | ICD-10-CM

## 2024-11-06 DIAGNOSIS — K76.89 HEPATIC CYST: ICD-10-CM

## 2024-11-06 DIAGNOSIS — E55.9 VITAMIN D DEFICIENCY: ICD-10-CM

## 2024-11-06 PROBLEM — M48.061 LUMBAR SPINAL STENOSIS: Status: ACTIVE | Noted: 2024-11-06

## 2024-11-06 LAB
25(OH)D3 SERPL-MCNC: 60.3 NG/ML (ref 30–100)
ALBUMIN SERPL-MCNC: 4.4 G/DL (ref 3.5–5.2)
ALBUMIN UR-MCNC: <1.2 MG/DL
ALBUMIN/GLOB SERPL: 1.6 G/DL
ALP SERPL-CCNC: 93 U/L (ref 39–117)
ALT SERPL W P-5'-P-CCNC: 41 U/L (ref 1–41)
ANION GAP SERPL CALCULATED.3IONS-SCNC: 10.9 MMOL/L (ref 5–15)
AST SERPL-CCNC: 36 U/L (ref 1–40)
BASOPHILS # BLD AUTO: 0.07 10*3/MM3 (ref 0–0.2)
BASOPHILS NFR BLD AUTO: 1.1 % (ref 0–1.5)
BILIRUB SERPL-MCNC: 0.7 MG/DL (ref 0–1.2)
BUN SERPL-MCNC: 24 MG/DL (ref 8–23)
BUN/CREAT SERPL: 19.5 (ref 7–25)
CALCIUM SPEC-SCNC: 10.3 MG/DL (ref 8.6–10.5)
CHLORIDE SERPL-SCNC: 101 MMOL/L (ref 98–107)
CHOLEST SERPL-MCNC: 152 MG/DL (ref 0–200)
CO2 SERPL-SCNC: 25.1 MMOL/L (ref 22–29)
CREAT SERPL-MCNC: 1.23 MG/DL (ref 0.76–1.27)
CREAT UR-MCNC: 43.2 MG/DL
DEPRECATED RDW RBC AUTO: 37.8 FL (ref 37–54)
EGFRCR SERPLBLD CKD-EPI 2021: 63.6 ML/MIN/1.73
EOSINOPHIL # BLD AUTO: 0.16 10*3/MM3 (ref 0–0.4)
EOSINOPHIL NFR BLD AUTO: 2.4 % (ref 0.3–6.2)
ERYTHROCYTE [DISTWIDTH] IN BLOOD BY AUTOMATED COUNT: 11.7 % (ref 12.3–15.4)
GLOBULIN UR ELPH-MCNC: 2.8 GM/DL
GLUCOSE SERPL-MCNC: 140 MG/DL (ref 65–99)
HBA1C MFR BLD: 6.1 % (ref 4.8–5.6)
HCT VFR BLD AUTO: 50.2 % (ref 37.5–51)
HCV AB SER QL: NORMAL
HDLC SERPL-MCNC: 66 MG/DL (ref 40–60)
HGB BLD-MCNC: 17.3 G/DL (ref 13–17.7)
IMM GRANULOCYTES # BLD AUTO: 0.03 10*3/MM3 (ref 0–0.05)
IMM GRANULOCYTES NFR BLD AUTO: 0.5 % (ref 0–0.5)
LDLC SERPL CALC-MCNC: 63 MG/DL (ref 0–100)
LDLC/HDLC SERPL: 0.9 {RATIO}
LYMPHOCYTES # BLD AUTO: 1.46 10*3/MM3 (ref 0.7–3.1)
LYMPHOCYTES NFR BLD AUTO: 22.1 % (ref 19.6–45.3)
MCH RBC QN AUTO: 30.7 PG (ref 26.6–33)
MCHC RBC AUTO-ENTMCNC: 34.5 G/DL (ref 31.5–35.7)
MCV RBC AUTO: 89.2 FL (ref 79–97)
MICROALBUMIN/CREAT UR: NORMAL MG/G{CREAT}
MONOCYTES # BLD AUTO: 0.67 10*3/MM3 (ref 0.1–0.9)
MONOCYTES NFR BLD AUTO: 10.1 % (ref 5–12)
NEUTROPHILS NFR BLD AUTO: 4.23 10*3/MM3 (ref 1.7–7)
NEUTROPHILS NFR BLD AUTO: 63.8 % (ref 42.7–76)
NRBC BLD AUTO-RTO: 0 /100 WBC (ref 0–0.2)
PLATELET # BLD AUTO: 267 10*3/MM3 (ref 140–450)
PMV BLD AUTO: 10.8 FL (ref 6–12)
POTASSIUM SERPL-SCNC: 4.7 MMOL/L (ref 3.5–5.2)
PROT SERPL-MCNC: 7.2 G/DL (ref 6–8.5)
PSA SERPL-MCNC: 1.14 NG/ML (ref 0–4)
RBC # BLD AUTO: 5.63 10*6/MM3 (ref 4.14–5.8)
SODIUM SERPL-SCNC: 137 MMOL/L (ref 136–145)
TESTOST SERPL-MCNC: 638 NG/DL (ref 193–740)
TRIGL SERPL-MCNC: 134 MG/DL (ref 0–150)
VLDLC SERPL-MCNC: 23 MG/DL (ref 5–40)
WBC NRBC COR # BLD AUTO: 6.62 10*3/MM3 (ref 3.4–10.8)

## 2024-11-06 PROCEDURE — 82570 ASSAY OF URINE CREATININE: CPT | Performed by: STUDENT IN AN ORGANIZED HEALTH CARE EDUCATION/TRAINING PROGRAM

## 2024-11-06 PROCEDURE — 85025 COMPLETE CBC W/AUTO DIFF WBC: CPT | Performed by: STUDENT IN AN ORGANIZED HEALTH CARE EDUCATION/TRAINING PROGRAM

## 2024-11-06 PROCEDURE — 84403 ASSAY OF TOTAL TESTOSTERONE: CPT | Performed by: STUDENT IN AN ORGANIZED HEALTH CARE EDUCATION/TRAINING PROGRAM

## 2024-11-06 PROCEDURE — 82043 UR ALBUMIN QUANTITATIVE: CPT | Performed by: STUDENT IN AN ORGANIZED HEALTH CARE EDUCATION/TRAINING PROGRAM

## 2024-11-06 PROCEDURE — 86803 HEPATITIS C AB TEST: CPT | Performed by: STUDENT IN AN ORGANIZED HEALTH CARE EDUCATION/TRAINING PROGRAM

## 2024-11-06 PROCEDURE — 80053 COMPREHEN METABOLIC PANEL: CPT | Performed by: STUDENT IN AN ORGANIZED HEALTH CARE EDUCATION/TRAINING PROGRAM

## 2024-11-06 PROCEDURE — 83036 HEMOGLOBIN GLYCOSYLATED A1C: CPT | Performed by: STUDENT IN AN ORGANIZED HEALTH CARE EDUCATION/TRAINING PROGRAM

## 2024-11-06 PROCEDURE — 84153 ASSAY OF PSA TOTAL: CPT | Performed by: STUDENT IN AN ORGANIZED HEALTH CARE EDUCATION/TRAINING PROGRAM

## 2024-11-06 PROCEDURE — 80061 LIPID PANEL: CPT | Performed by: STUDENT IN AN ORGANIZED HEALTH CARE EDUCATION/TRAINING PROGRAM

## 2024-11-06 PROCEDURE — 82306 VITAMIN D 25 HYDROXY: CPT | Performed by: STUDENT IN AN ORGANIZED HEALTH CARE EDUCATION/TRAINING PROGRAM

## 2024-11-06 RX ORDER — TAMSULOSIN HYDROCHLORIDE 0.4 MG/1
1 CAPSULE ORAL DAILY
Qty: 30 CAPSULE | Refills: 2 | Status: SHIPPED | OUTPATIENT
Start: 2024-11-06

## 2024-11-06 RX ORDER — CHOLECALCIFEROL TAB 125 MCG (5000 UNIT) 125 MCG (5000 UT)
1 TAB DAILY
COMMUNITY
Start: 2024-10-26 | End: 2024-11-06 | Stop reason: SDUPTHER

## 2024-11-06 RX ORDER — ROSUVASTATIN CALCIUM 40 MG/1
1 TABLET, COATED ORAL DAILY
COMMUNITY
Start: 2024-09-17 | End: 2024-11-06 | Stop reason: SDUPTHER

## 2024-11-06 RX ORDER — LIRAGLUTIDE 6 MG/ML
INJECTION SUBCUTANEOUS
Qty: 6 ML | Refills: 2 | Status: SHIPPED | OUTPATIENT
Start: 2024-11-06 | End: 2024-11-11

## 2024-11-06 RX ORDER — HYDROCHLOROTHIAZIDE 25 MG/1
25 TABLET ORAL DAILY
Qty: 90 TABLET | Refills: 1 | Status: SHIPPED | OUTPATIENT
Start: 2024-11-06

## 2024-11-06 RX ORDER — AMLODIPINE BESYLATE 10 MG/1
10 TABLET ORAL DAILY
Qty: 90 TABLET | Refills: 1 | Status: SHIPPED | OUTPATIENT
Start: 2024-11-06

## 2024-11-06 RX ORDER — ROSUVASTATIN CALCIUM 40 MG/1
40 TABLET, COATED ORAL DAILY
Qty: 90 TABLET | Refills: 1 | Status: SHIPPED | OUTPATIENT
Start: 2024-11-06

## 2024-11-06 RX ORDER — CHOLECALCIFEROL TAB 125 MCG (5000 UNIT) 125 MCG (5000 UT)
1 TAB DAILY
Qty: 90 TABLET | Refills: 1 | Status: SHIPPED | OUTPATIENT
Start: 2024-11-06

## 2024-11-06 RX ORDER — LISINOPRIL 40 MG/1
40 TABLET ORAL DAILY
Qty: 90 TABLET | Refills: 1 | Status: SHIPPED | OUTPATIENT
Start: 2024-11-06

## 2024-11-06 NOTE — TELEPHONE ENCOUNTER
Caller: Rik Zayas I    Relationship: Self    Best call back number:        What was the call regarding:       THE PATIENT SAID HE DID NOT RECEIVE HIS     tamsulosin (FLOMAX) 0.4 MG capsule 24 hr capsule         AND THE SLEEP MEDICATION PCP ALONSO SAID HE WILL PRESCRIBE.  HE IS REQUESTING PCP TO SEND THIS OVER TO THE PHARMACY [facility] [address] - [phone]  - [phone]  [phone]

## 2024-11-06 NOTE — PROGRESS NOTES
Chief Complaint  Chief Complaint   Patient presents with    Annual Exam        Subjective       Rik Zayas presents to [facility]    History of Present Illness  The patient presents for evaluation of type 2 diabetes and hypertension.    He has been managing his type 2 diabetes, high cholesterol, and hypertension with medication. His last A1c was within the normal range, but he suspects it may have increased as he has only been taking Jardiance 10 mg for the past 2 months. He was previously on Trulicity but switched to Ozempic 1 mg weekly due to the high cost of Trulicity. However, he experienced gastrointestinal issues with Ozempic, including diarrhea 3 to 4 times a day, which led to dehydration. He has since discontinued Ozempic.    He has been on amlodipine 10 mg, hydrochlorothiazide 25 mg, and lisinopril 40 mg for hypertension for about 15 years. His blood pressure at home is consistently around 120/70 or 80. He reports no headaches or shortness of breath.    He has arthritis in his lower back and left knee and has been taking ibuprofen 400 mg every 4 hours for pain management. He has also had hernia repair and left knee surgery, which involved meniscus and ACL repair. Despite the surgery, he still experiences significant pain in his left knee, which affects his gait. He has not received any knee injections.    He is currently on Xyosted 50 mg weekly, an injectable testosterone replacement therapy, and has been on it for about a year.    He has lymphedema, which was diagnosed after a Doppler test and ultrasound. He has been wearing compression socks for the past 3 years to manage the swelling.    He also has erectile dysfunction and experiences frequent urination at night, waking up 2 to 3 times. He is interested in medication to help manage this.    SOCIAL HISTORY  He does not smoke. He is a social drinker. He does not do drugs.    FAMILY HISTORY  His mother and father  both had diabetes. They are . No heart attacks or lung conditions.    Past Medical History:    Diabetes mellitus    Hyperlipemia    Hypertension         Allergies   Allergen Reactions    Sulfa Antibiotics Rash          Past Surgical History:   Procedure Laterality Date    HERNIA REPAIR      KNEE SURGERY Left     repair of meniscus and ACL          Social History     Tobacco Use    Smoking status: Never     Passive exposure: Never    Smokeless tobacco: Never   Vaping Use    Vaping status: Never Used   Substance Use Topics    Alcohol use: Not Currently     Comment: soc    Drug use: Never         Family History   Problem Relation Age of Onset    Diabetes Mother     Diabetes Father           Current Outpatient Medications on File Prior to Visit   Medication Sig    ibuprofen (ADVIL,MOTRIN) 200 MG tablet Take two as needed    Xyosted 50 MG/0.5ML solution auto-injector     [DISCONTINUED] amLODIPine (NORVASC) 10 MG tablet Take 1 tablet by mouth Daily.    [DISCONTINUED] empagliflozin (JARDIANCE) 10 MG tablet tablet Take  by mouth Daily.    [DISCONTINUED] hydroCHLOROthiazide (HYDRODIURIL) 25 MG tablet Take 1 tablet by mouth Daily.    [DISCONTINUED] lisinopril (PRINIVIL,ZESTRIL) 40 MG tablet Take 1 tablet by mouth Daily.    [DISCONTINUED] Natural Vitamin D-3 125 MCG (5000 UT) tablet Take 1 tablet by mouth Daily.    [DISCONTINUED] rosuvastatin (CRESTOR) 40 MG tablet Take 1 tablet by mouth Daily.    [DISCONTINUED] vitamin D (ERGOCALCIFEROL) 1.25 MG (81325 UT) capsule capsule     [DISCONTINUED] Testosterone Cypionate (DEPOTESTOTERONE CYPIONATE) 200 MG/ML injection INJECT 0.3 ML INTO THE MUSCLE ONCE WEEKLY (Patient not taking: Reported on 2024)    [DISCONTINUED] Trulicity 4.5 MG/0.5ML solution pen-injector  (Patient not taking: Reported on 2024)     No current facility-administered medications on file prior to visit.         Immunization History   Administered Date(s) Administered    COVID-19 (FitLinxx) 2021  "   COVID-19 (PFIZER) Purple Cap Monovalent 11/01/2021    Covid-19 (Pfizer) Gray Cap Monovalent 03/24/2022, 08/15/2022    Fluad Quad 65+ 11/01/2021    Flublok 18+yrs 09/30/2019    Fluzone (or Fluarix & Flulaval for VFC) >6mos 10/31/2020    Pneumococcal Conjugate 13-Valent (PCV13) 07/29/2020    Shingrix 06/23/2021, 09/10/2021    Tdap 06/23/2021             Objective         /72 (BP Location: Left arm, Patient Position: Sitting)   Pulse 83   Temp 99.2 °F (37.3 °C) (Oral)   Ht 182.9 cm (72\")   Wt 99.2 kg (218 lb 12.8 oz)   SpO2 97%   BMI 29.67 kg/m²           Physical Exam  Physical Exam  HENT:      Head: Normocephalic and atraumatic.      Nose: Nose normal.      Mouth/Throat:      Mouth: Mucous membranes are moist.   Eyes:      Extraocular Movements: Extraocular movements intact.      Conjunctiva/sclera: Conjunctivae normal.   Cardiovascular:      Rate and Rhythm: Normal rate and regular rhythm.      Heart sounds: No murmur heard.     No friction rub. No gallop.   Pulmonary:      Effort: No respiratory distress.      Breath sounds: No wheezing, rhonchi or rales.   Abdominal:      General: Abdomen is flat. There is no distension.   Musculoskeletal:         General: No swelling.      Cervical back: Neck supple.   Skin:     General: Skin is warm and dry.   Neurological:      General: No focal deficit present.      Mental Status: He is alert and oriented to person, place, and time.   Psychiatric:         Mood and Affect: Mood normal.         Behavior: Behavior normal.         Thought Content: Thought content normal.         Judgment: Judgment normal.         Physical Exam        Result Review :    Results  Imaging  MRI of the knee in 2019 showed advanced degenerative changes consistent with osteoarthritis in the medial compartments, extensive cartilage loss, complex tear of the meniscus, moderate thinning of the articular cartilage and the lateral compartments, moderate to marked chondromalacia, joint effusion, " and ACL tear.             Assessment and Plan     Diagnoses and all orders for this visit:    1. Type 2 diabetes mellitus with hyperlipidemia (Primary)  -     Microalbumin / Creatinine Urine Ratio - Urine, Clean Catch  -     Hemoglobin A1c  -     Comprehensive Metabolic Panel  -     Liraglutide (Victoza) 18 MG/3ML solution pen-injector injection; Inject 0.6 mg daily for 1 week then inject 1.2 mg daily thereafter.  Dispense: 6 mL; Refill: 2  -     empagliflozin (JARDIANCE) 10 MG tablet tablet; Take 1 tablet by mouth Daily.  Dispense: 90 tablet; Refill: 1    2. Primary hypertension  -     Comprehensive Metabolic Panel  -     amLODIPine (NORVASC) 10 MG tablet; Take 1 tablet by mouth Daily.  Dispense: 90 tablet; Refill: 1  -     hydroCHLOROthiazide 25 MG tablet; Take 1 tablet by mouth Daily.  Dispense: 90 tablet; Refill: 1  -     lisinopril (PRINIVIL,ZESTRIL) 40 MG tablet; Take 1 tablet by mouth Daily.  Dispense: 90 tablet; Refill: 1    3. Chronic pain of left knee    4. Primary osteoarthritis of left knee  -     Ambulatory Referral to Orthopedic Surgery    5. Pure hypercholesterolemia  -     Lipid Panel  -     rosuvastatin (CRESTOR) 40 MG tablet; Take 1 tablet by mouth Daily.  Dispense: 90 tablet; Refill: 1    6. Vitamin D deficiency  -     Vitamin D,25-Hydroxy  -     Natural Vitamin D-3 125 MCG (5000 UT) tablet; Take 1 tablet by mouth Daily.  Dispense: 90 tablet; Refill: 1    7. Hypogonadism in male  -     CBC & Differential  -     Testosterone  -     PSA DIAGNOSTIC  -     Prolactin    8. Lymphedema    9. Hepatic cyst    10. Erectile dysfunction, unspecified erectile dysfunction type    11. Nocturia  -     tamsulosin (FLOMAX) 0.4 MG capsule 24 hr capsule; Take 1 capsule by mouth Daily.  Dispense: 30 capsule; Refill: 2    12. Need for hepatitis C screening test  -     Hepatitis C Antibody    13. Need for influenza vaccination  -     Fluzone High-Dose 65+yrs ([id number])        Assessment & Plan  1. Type 2 Diabetes  Mellitus.  His A1c from 2021 was not within the normal range. He has only been taking Jardiance 10 mg for the past 2 months due to the high cost of Trulicity and intolerance to Ozempic. He will start Victoza at 0.6 mg daily for 1 week, then increase to 1.2 mg daily thereafter. Blood work will be conducted to monitor his condition, including A1c, kidney function, and liver function. He is advised to bring any records he has at home to his next appointment.  Next appointment will do a foot exam and discuss vaccines such as pneumococcal.    2. Hypertension.  His blood pressure is well-controlled with amlodipine 10 mg, hydrochlorothiazide 25 mg, and lisinopril 40 mg. These medications will be refilled for 6 months, with a 3-month supply at a time.  Also obtain lipid panel and CMP today as well as microalbumin    3. Hyperlipidemia.  He is currently on rosuvastatin (Crestor) for cholesterol management. This medication will be refilled.  Goal of LDL less than 70    4. Left Knee Osteoarthritis.  An MRI of his left knee in 2019 revealed advanced degenerative changes indicative of osteoarthritis.  We discussed possible knee injections although patient would like to have surgery and request Dr. Platt as he performed his previous surgery for repair.  He will be referred to Dr. Moreau for further management.    5. Benign Prostatic Hyperplasia.  His prostate was slightly enlarged, and he experiences urinary symptoms such as slow stream and nocturia. Tamsulosin 0.4 mg will be prescribed to help with these symptoms. His prostate level will be checked due to his testosterone treatment.    6. Erectile Dysfunction.  He reports erectile dysfunction. This will be monitored alongside his other conditions.    7. Vitamin D Deficiency.  He is currently taking vitamin D3 5000 IU daily. His vitamin D level will be checked.    8. Testosterone Replacement Therapy.  He is currently on Xyosted 50 mg weekly. His testosterone level and  red blood cells will be checked to monitor for potential side effects.    9. Lymphedema.  He has a history of lymphedema, managed with compression socks. Amlodipine can increase swelling in the legs, but no changes will be made to his medication at this time.    10. Health Maintenance.  He will receive an influenza vaccine today. A specific foot exam will be conducted at his next appointment. He will be tested for hepatitis C as part of CDC recommendations.  11.  Hepatic cysts  Seen on CT scan abdomen pelvis.  Radiologist at that time recommended MRI with contrast to better characterize disease.  If his liver enzymes are elevated, an ultrasound will be considered.    Follow-up  Return in 1 month for follow up.          BMI is >= 25 and <30. (Overweight) The following options were offered after discussion;: pharmacological intervention options       Follow Up   Return in about 1 month (around 12/6/2024) for diabetes, BPH.    Patient was given instructions and counseling regarding his condition or for health maintenance advice. Please see specific information pulled into the AVS if appropriate.     Rikdarby Zayas  reports that he has never smoked. He has never been exposed to tobacco smoke. He has never used smokeless tobacco.        Patient or patient representative verbalized consent for the use of Ambient Listening during the visit with  Zurdo Riley DO for chart documentation. 11/6/2024  09:28 EST

## 2024-11-07 DIAGNOSIS — E29.1 HYPOGONADISM IN MALE: Primary | ICD-10-CM

## 2024-11-08 ENCOUNTER — TELEPHONE (OUTPATIENT)
Dept: FAMILY MEDICINE CLINIC | Facility: CLINIC | Age: 69
End: 2024-11-08
Payer: COMMERCIAL

## 2024-11-08 DIAGNOSIS — E78.5 TYPE 2 DIABETES MELLITUS WITH HYPERLIPIDEMIA: ICD-10-CM

## 2024-11-08 DIAGNOSIS — E11.69 TYPE 2 DIABETES MELLITUS WITH HYPERLIPIDEMIA: ICD-10-CM

## 2024-11-11 ENCOUNTER — DOCUMENTATION (OUTPATIENT)
Dept: FAMILY MEDICINE CLINIC | Facility: CLINIC | Age: 69
End: 2024-11-11
Payer: COMMERCIAL

## 2024-11-11 DIAGNOSIS — E11.69 TYPE 2 DIABETES MELLITUS WITH HYPERLIPIDEMIA: Primary | ICD-10-CM

## 2024-11-11 DIAGNOSIS — E78.5 TYPE 2 DIABETES MELLITUS WITH HYPERLIPIDEMIA: Primary | ICD-10-CM

## 2024-11-11 RX ORDER — LIRAGLUTIDE 6 MG/ML
0.6 INJECTION SUBCUTANEOUS DAILY
Qty: 9 ML | Refills: 3 | Status: SHIPPED | OUTPATIENT
Start: 2024-11-11

## 2024-11-11 NOTE — PROGRESS NOTES
Spoke with patient earlier today who stated [facility] did not receive a current prescription for.  He states they needed clarification for diagnosis as to why he is receiving Victoza as well as specific prescription for Victoza not liraglutide which is generic.    This provider spent 39 minutes in total on the phone with [facility].  Originally they told me that prescription was never received and I would need to call this in.  I spoke with Jyoti, who is prior Auth representative and obtained prior Auth for patient's Victoza.  I spoke with Simon, pharmacist and called in a verbal order for Victoza 0.6 mg daily.  I specified patient would need a 3-month supply with 3 refills.  Simon verbalized prescription details and stated patient would receive this in 5 to 7 days.  I then discussed this with Otilia Alvarado LPN who will now call the patient to confirm receipt.    Electronically signed by Zurdo Riley DO, 11/11/24, 12:46 PM EST.

## 2024-11-15 ENCOUNTER — OFFICE VISIT (OUTPATIENT)
Age: 69
End: 2024-11-15
Payer: COMMERCIAL

## 2024-11-15 VITALS
DIASTOLIC BLOOD PRESSURE: 78 MMHG | HEIGHT: 72 IN | SYSTOLIC BLOOD PRESSURE: 124 MMHG | WEIGHT: 218 LBS | BODY MASS INDEX: 29.53 KG/M2

## 2024-11-15 DIAGNOSIS — I89.0 LYMPHEDEMA: Primary | ICD-10-CM

## 2024-11-15 PROCEDURE — 99213 OFFICE O/P EST LOW 20 MIN: CPT | Performed by: NURSE PRACTITIONER

## 2024-11-15 NOTE — PROGRESS NOTES
Chief Complaint  New Patient  (Previous ESR Patient, here for a referral to [facility]. )    Subjective        Rik Zayas presents to [facility]  HPI   Rik Zayas is a 69 y.o. male that has been followed in our office for bilateral lower extremity lymphedema.  He has been seen previously by Dr. James.  He has had decompression therapy with [facility] and has been in custom fit compression.  He returns today in follow up. He has been doing well without hospitalizations or surgeries.  He does very well with his compression stockings that he received from [facility].  He does need a new referral in order to get a new pair.  He does feel as though the ones he has are stretched out and he has not had adequate compression over the past several weeks.  Review of Systems   Constitutional:  Negative for fever.   Eyes:  Negative for visual disturbance.   Cardiovascular:  Negative for leg swelling.   Gastrointestinal:  Negative for abdominal pain.   Musculoskeletal:  Negative for back pain.   Skin:  Negative for color change, pallor and wound.   Neurological:  Negative for dizziness, facial asymmetry, speech difficulty and weakness.        Rik Zayas  reports that he has never smoked. He has never been exposed to tobacco smoke. He has never used smokeless tobacco..        Objective   Vital Signs:  Vitals:    11/15/24 1028   BP: 124/78      Body mass index is 29.56 kg/m².           Physical Exam  Vitals reviewed.   Constitutional:       Appearance: Normal appearance.   HENT:      Head: Normocephalic.   Cardiovascular:      Rate and Rhythm: Normal rate and regular rhythm.      Pulses: Normal pulses.           Dorsalis pedis pulses are 3+ on the right side and 3+ on the left side.        Posterior tibial pulses are 3+ on the right side and 3+ on the left side.   Pulmonary:      Effort: Pulmonary effort is normal.   Skin:     General: Skin is warm.    Neurological:      General: No focal deficit present.      Mental Status: He is alert and oriented to person, place, and time.   Psychiatric:         Mood and Affect: Mood normal.          Result Review :                         Assessment and Plan     Diagnoses and all orders for this visit:    1. Lymphedema (Primary)  -     Ambulatory Referral to Lymphedema Clinic      Patient presents today for follow-up with lymphedema.  I have sent a new referral for [facility] to get him back in his custom fit compression.  If there are any issues with this, he is to let me know.  We will see him on an as-needed basis.           Follow Up     No follow-ups on file.  Patient was given instructions and counseling regarding his condition or for health maintenance advice. Please see specific information pulled into the AVS if appropriate.     JAIR Mishra

## 2024-11-16 ENCOUNTER — TELEMEDICINE (OUTPATIENT)
Dept: FAMILY MEDICINE CLINIC | Facility: TELEHEALTH | Age: 69
End: 2024-11-16
Payer: COMMERCIAL

## 2024-11-16 VITALS — HEART RATE: 92 BPM | TEMPERATURE: 98.4 F

## 2024-11-16 DIAGNOSIS — J01.40 ACUTE PANSINUSITIS, RECURRENCE NOT SPECIFIED: Primary | ICD-10-CM

## 2024-11-16 PROCEDURE — 99213 OFFICE O/P EST LOW 20 MIN: CPT | Performed by: NURSE PRACTITIONER

## 2024-11-16 RX ORDER — PREDNISONE 10 MG/1
TABLET ORAL
Qty: 21 TABLET | Refills: 0 | Status: SHIPPED | OUTPATIENT
Start: 2024-11-16

## 2024-11-16 NOTE — PROGRESS NOTES
You have chosen to receive care through a telehealth visit.  Do you consent to use a video/audio connection for your medical care today? Yes     CHIEF COMPLAINT  No chief complaint on file.        HPI  Rik Zayas is a 69 y.o. male  presents with complaint of 2 weeks history of nasal congestion with yellow bloody discharge, facial pain/pressure, ear fullness, intermittent low grade fever, PND, mild sore throat.  Denies cough, wheezing, shortness of breath.     Review of Systems  See HPI    Past Medical History:   Diagnosis Date    Diabetes mellitus     Hyperlipemia     Hypertension        Family History   Problem Relation Age of Onset    Diabetes Mother     Diabetes Father        Social History     Socioeconomic History    Marital status:    Tobacco Use    Smoking status: Never     Passive exposure: Never    Smokeless tobacco: Never   Vaping Use    Vaping status: Never Used   Substance and Sexual Activity    Alcohol use: Not Currently     Comment: soc    Drug use: Never    Sexual activity: Defer       Rik Zayas  reports that he has never smoked. He has never been exposed to tobacco smoke. He has never used smokeless tobacco.               There were no vitals taken for this visit.    PHYSICAL EXAM  Physical Exam   Constitutional: He is oriented to person, place, and time. He appears well-developed and well-nourished. He does not have a sickly appearance. He does not appear ill.   HENT:   Head: Normocephalic and atraumatic.   Pulmonary/Chest: Effort normal.  No respiratory distress.  Neurological: He is alert and oriented to person, place, and time.         Diagnoses and all orders for this visit:    1. Acute pansinusitis, recurrence not specified (Primary)  -     amoxicillin-clavulanate (AUGMENTIN) 875-125 MG per tablet; Take 1 tablet by mouth 2 (Two) Times a Day for 10 days.  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 Blood loss 1300cc, transfused 1upRBC. VSS throughout case with 4500cc fluid. shelia Baird.   - abida/shelia per ICU  - trend CBC  - has dissolvable packing in R nasal cavity  - can use afrin/floseal prn for bleeding  - nasal mustache dressing prn, can change as needed  - diet as tolerated  - activity as tolerated  - pain control  - ancef x24 hours  - discussed with attending Dispense: 21 tablet; Refill: 0    --take medications as prescribed  --increase fluids, rest as needed, tylenol or ibuprofen for pain  --f/u in 5-7 days if no improvement        FOLLOW-UP  As discussed during visit with PCP/[facility] if no improvement or Urgent Care/Emergency Department if worsening of symptoms    Patient verbalizes understanding of medication dosage, comfort measures, instructions for treatment and follow-up.    Rhiannon CASTROMandie Taylor, APRN  11/16/2024  09:34 EST    Mode of Visit: Video  Location of patient: -OTHER-: [facility]  Location of provider: +HOME+  You have chosen to receive care through a telehealth visit.  The patient has signed the video visit consent form.  The visit included audio and video interaction. No technical issues occurred during this visit.      Note Disclaimer: At [facility], we believe that sharing information builds trust and better   relationships. You are receiving this note because you recently visited [facility]. It is possible you   will see health information before a provider has talked with you about it. This kind of information can   be easy to misunderstand. To help you fully understand what it means for your health, we urge you to   discuss this note with your provider.     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 Blood loss 1300cc, transfused 1upRBC. VSS throughout case with 4500cc fluid. shelia Baird.   - abida/shelia per ICU  - trend CBC  - has dissolvable packing in R nasal cavity  - can use afrin/floseal prn for bleeding  - nasal mustache dressing prn, can change as needed  - diet as tolerated  - activity as tolerated  - pain control  - MRI orbits with and wihtout contrast tomorrow  - ancef x24 hours  - discussed with attending

## 2024-11-18 ENCOUNTER — DOCUMENTATION (OUTPATIENT)
Dept: FAMILY MEDICINE CLINIC | Facility: CLINIC | Age: 69
End: 2024-11-18
Payer: COMMERCIAL

## 2024-11-18 NOTE — PROGRESS NOTES
Patient stopped by facility wondering if we had any information concerning his Victoza.  Informed him that Dr. Riley had spoke to the pharmacy on 11/11/24 for about 40 minutes and it was supposed to be shipped within 5 to 7 days.   Patient looked at his messages and [employer] had sent a message stating product had been shipped.  Told patient if that was not his medication to call me and let me know and I would find out what the problem is.

## 2024-11-25 DIAGNOSIS — E78.5 TYPE 2 DIABETES MELLITUS WITH HYPERLIPIDEMIA: Primary | ICD-10-CM

## 2024-11-25 DIAGNOSIS — E11.69 TYPE 2 DIABETES MELLITUS WITH HYPERLIPIDEMIA: Primary | ICD-10-CM

## 2024-11-27 DIAGNOSIS — E11.69 TYPE 2 DIABETES MELLITUS WITH HYPERLIPIDEMIA: Primary | ICD-10-CM

## 2024-11-27 DIAGNOSIS — E78.5 TYPE 2 DIABETES MELLITUS WITH HYPERLIPIDEMIA: Primary | ICD-10-CM

## 2024-11-27 RX ORDER — PEN NEEDLE, DIABETIC 30 GX5/16"
NEEDLE, DISPOSABLE MISCELLANEOUS
Qty: 100 EACH | Refills: 5 | Status: SHIPPED | OUTPATIENT
Start: 2024-11-27

## 2024-12-03 ENCOUNTER — OFFICE VISIT (OUTPATIENT)
Dept: FAMILY MEDICINE CLINIC | Facility: CLINIC | Age: 69
End: 2024-12-03
Payer: COMMERCIAL

## 2024-12-03 VITALS
HEART RATE: 77 BPM | WEIGHT: 219.4 LBS | BODY MASS INDEX: 29.72 KG/M2 | TEMPERATURE: 98.4 F | HEIGHT: 72 IN | OXYGEN SATURATION: 98 % | SYSTOLIC BLOOD PRESSURE: 130 MMHG | DIASTOLIC BLOOD PRESSURE: 80 MMHG

## 2024-12-03 DIAGNOSIS — E11.69 TYPE 2 DIABETES MELLITUS WITH HYPERLIPIDEMIA: Primary | ICD-10-CM

## 2024-12-03 DIAGNOSIS — M21.612 BUNION OF GREAT TOE OF LEFT FOOT: ICD-10-CM

## 2024-12-03 DIAGNOSIS — E29.1 HYPOGONADISM IN MALE: ICD-10-CM

## 2024-12-03 DIAGNOSIS — E78.5 TYPE 2 DIABETES MELLITUS WITH HYPERLIPIDEMIA: Primary | ICD-10-CM

## 2024-12-03 DIAGNOSIS — M21.42 PES PLANUS OF BOTH FEET: ICD-10-CM

## 2024-12-03 DIAGNOSIS — M21.41 PES PLANUS OF BOTH FEET: ICD-10-CM

## 2024-12-03 PROCEDURE — 99214 OFFICE O/P EST MOD 30 MIN: CPT | Performed by: STUDENT IN AN ORGANIZED HEALTH CARE EDUCATION/TRAINING PROGRAM

## 2025-01-24 ENCOUNTER — DOCUMENTATION (OUTPATIENT)
Dept: FAMILY MEDICINE CLINIC | Facility: CLINIC | Age: 70
End: 2025-01-24
Payer: COMMERCIAL

## 2025-01-24 DIAGNOSIS — E29.1 HYPOGONADISM IN MALE: Primary | ICD-10-CM

## 2025-01-24 RX ORDER — TESTOSTERONE ENANTHATE 50 MG/.5ML
50 INJECTION SUBCUTANEOUS WEEKLY
Qty: 1.96 ML | Refills: 2 | Status: SHIPPED | OUTPATIENT
Start: 2025-01-24

## 2025-01-24 NOTE — TELEPHONE ENCOUNTER
Reviewed PSA which is normal.  Hemoglobin is upper limit of normal but normal range.  Testosterone is in the 600s.  Patient was receiving this through his previous PCP.  We will give enough refills until next appointment and then recheck prolactin and consider checking hemoglobin at that time.

## 2025-01-24 NOTE — TELEPHONE ENCOUNTER
[facility] [address] [facility] AT Portal to Registered [facility] - [phone]  - [phone]    Relationship: Pharmacy    Best call back number: [phone]- CLINICAL LINE     Requested Prescriptions:   Requested Prescriptions     Pending Prescriptions Disp Refills    Xyosted 50 MG/0.5ML solution auto-injector 1.96 mL         [facility] be [facility] - [address] [facility] AT PORTAL TO REGISTERED [facility] - [phone]  - [phone] FX     Last office visit with prescribing clinician: 12/3/2024   Last telemedicine visit with prescribing clinician: Visit date not found   Next office visit with prescribing clinician: 3/4/2025     Additional details provided by patient: PATIENT IS OUT OF THIS SCRIPT CURRENTLY AND WOULD LIKE 90 DAY SUPPLY OF SCRIPT.     Does the patient have less than a 3 day supply:  [x] Yes  [] No    Would you like a call back once the refill request has been completed: [] Yes [x] No    If the office needs to give you a call back, can they leave a voicemail: [] Yes [x] No    Alek Avitia Rep   01/24/25 10:22 EST

## 2025-01-24 NOTE — TELEPHONE ENCOUNTER
PATIENT IS CALLING TO CHECK THE STATUS OF THE MEDICATION WITH ONLY A WEEK LEFT ON HAND WANTED TO SPEAK WITH SOMEONE IN OFFICE IN REGARDS TO THE REQUEST, HUB TRANSFER TO CLINICAL TRANSFER NUMBER  FOR UPDATES.

## 2025-02-08 ENCOUNTER — TELEMEDICINE (OUTPATIENT)
Dept: FAMILY MEDICINE CLINIC | Facility: TELEHEALTH | Age: 70
End: 2025-02-08
Payer: COMMERCIAL

## 2025-02-08 VITALS — HEART RATE: 75 BPM | TEMPERATURE: 95.9 F

## 2025-02-08 DIAGNOSIS — J01.40 ACUTE PANSINUSITIS, RECURRENCE NOT SPECIFIED: Primary | ICD-10-CM

## 2025-02-08 RX ORDER — FLUTICASONE PROPIONATE 50 MCG
SPRAY, SUSPENSION (ML) NASAL
COMMUNITY

## 2025-02-08 RX ORDER — PREDNISONE 10 MG/1
TABLET ORAL
Qty: 21 TABLET | Refills: 0 | Status: SHIPPED | OUTPATIENT
Start: 2025-02-08

## 2025-02-08 RX ORDER — ACETAMINOPHEN 500 MG
2 TABLET ORAL EVERY 6 HOURS PRN
COMMUNITY

## 2025-02-08 RX ORDER — SEMAGLUTIDE 1.34 MG/ML
INJECTION, SOLUTION SUBCUTANEOUS
COMMUNITY

## 2025-02-08 NOTE — PROGRESS NOTES
You have chosen to receive care through a telehealth visit.  Do you consent to use a video/audio connection for your medical care today? Yes     Patient or patient representative verbalized consent for the use of Ambient Listening during the visit with  JAIR Gayle for chart documentation. 2/8/2025  10:03 EST    CHIEF COMPLAINT  No chief complaint on file.        HPI  History of Present Illness  The patient is a 69-year-old male presenting with complaints of sinus issues.    He has been experiencing symptoms for approximately 10 to 11 days, including severe headaches, sinus pressure in the forehead and cheeks, and semi-yellowish discharge with blood when blowing his nose. He also reports the need to frequently clear his ears using the Valsalva maneuver and occasional low-grade fevers. He wanted to make sure that it was a bacterial issue and not viral, so he waited this long. These symptoms are recurrent in nature.    ALLERGIES  The patient is allergic to SULFA ANTIBIOTICS.       Review of Systems  See HPI    Past Medical History:   Diagnosis Date    Diabetes mellitus     Hyperlipemia     Hypertension        Family History   Problem Relation Age of Onset    Diabetes Mother     Diabetes Father        Social History     Socioeconomic History    Marital status:    Tobacco Use    Smoking status: Never     Passive exposure: Never    Smokeless tobacco: Never   Vaping Use    Vaping status: Never Used   Substance and Sexual Activity    Alcohol use: Not Currently     Comment: soc    Drug use: Never    Sexual activity: Defer       Rik Zayas  reports that he has never smoked. He has never been exposed to tobacco smoke. He has never used smokeless tobacco.             Pulse 75   Temp 95.9 °F (35.5 °C)     PHYSICAL EXAM  Physical Exam   Constitutional: He is oriented to person, place, and time. He appears well-developed and well-nourished. He does not have a sickly appearance. He does not appear ill.    HENT:   Head: Normocephalic and atraumatic.   Nose: Right sinus exhibits maxillary sinus tenderness and frontal sinus tenderness. Left sinus exhibits maxillary sinus tenderness and frontal sinus tenderness.   Pulmonary/Chest: Effort normal.  No respiratory distress.  Neurological: He is alert and oriented to person, place, and time.           Diagnoses and all orders for this visit:    1. Acute pansinusitis, recurrence not specified (Primary)  -     amoxicillin-clavulanate (AUGMENTIN) 875-125 MG per tablet; Take 1 tablet by mouth 2 (Two) Times a Day for 7 days.  Dispense: 14 tablet; Refill: 0  -     predniSONE (DELTASONE) 10 MG (21) dose pack; Use as directed on package  Dispense: 21 tablet; Refill: 0    --take medications as prescribed  --increase fluids, rest as needed, tylenol or ibuprofen for pain  --f/u in 5-7 days if no improvement      Assessment & Plan  1. Sinusitis.  He reports severe headaches, sinus pressure in the forehead and cheeks, semi-yellow discharge with blood, and intermittent low-grade fever for the past 10-11 days. Temperature and heart rate were checked. He is allergic to sulfa antibiotics. A prescription for Augmentin and prednisone has been sent to [facility] in [place] on [address].         FOLLOW-UP  As discussed during visit with PCP/[facility] if no improvement or Urgent Care/Emergency Department if worsening of symptoms    Patient verbalizes understanding of medication dosage, comfort measures, instructions for treatment and follow-up.    JAIR Gayle  02/08/2025  10:03 EST    Mode of Visit: Video  Location of patient: -OTHER-: [facility]  Location of provider: +HOME+  You have chosen to receive care through a telehealth visit.  The patient has signed the video visit consent form.  The visit included audio and video interaction. No technical issues occurred during this visit.    The use of a video visit has been reviewed with the patient and verbal  informed consent has been obtained. Myself and Rik Zayas     participated in this visit. The patient is located in [address] [place] [address] [place] [address]  I am located in [place], [place]. [employer] and 24h00 Video Client were utilized. I spent 8 minutes in the patient's chart for this visit.      Note Disclaimer: At [facility], we believe that sharing information builds trust and better   relationships. You are receiving this note because you recently visited [facility]. It is possible you   will see health information before a provider has talked with you about it. This kind of information can   be easy to misunderstand. To help you fully understand what it means for your health, we urge you to   discuss this note with your provider.

## 2025-02-14 ENCOUNTER — TELEPHONE (OUTPATIENT)
Dept: FAMILY MEDICINE CLINIC | Facility: CLINIC | Age: 70
End: 2025-02-14

## 2025-02-14 NOTE — TELEPHONE ENCOUNTER
Caller: Rik Zayas I    Relationship: Self    Best call back number: [phone]     What is the best time to reach you: ANYTIME     Who are you requesting to speak with (clinical staff, provider,  specific staff member): CLINICAL/ BARTOLO       What was the call regarding: PATIENT IS CALLING TO CHECK IF EPA WAS RECEIVED FOR A MEDICATION Liraglutide (Victoza) 18 MG/3ML solution , MEDICATION IS LISTED AS DISCOUNTED IN PATIENT CHART.       Liraglutide (Victoza) 18 MG/3ML solution pen-injector injection [[id number]] (Order [id number])  Order  Discontinued  Date: 11/11/2024 Department: [facility] Ordering/Authorizing: Zurdo Riley DO

## 2025-02-17 DIAGNOSIS — E78.5 TYPE 2 DIABETES MELLITUS WITH HYPERLIPIDEMIA: Primary | ICD-10-CM

## 2025-02-17 DIAGNOSIS — E11.69 TYPE 2 DIABETES MELLITUS WITH HYPERLIPIDEMIA: Primary | ICD-10-CM

## 2025-02-17 RX ORDER — LIRAGLUTIDE 6 MG/ML
1.2 INJECTION SUBCUTANEOUS DAILY
Qty: 18 ML | Refills: 1 | Status: SHIPPED | OUTPATIENT
Start: 2025-02-17

## 2025-02-19 NOTE — TELEPHONE ENCOUNTER
Telephone call to patient informed him that his Victoza was approved.  Patient voiced understanding.

## 2025-03-04 ENCOUNTER — OFFICE VISIT (OUTPATIENT)
Dept: FAMILY MEDICINE CLINIC | Facility: CLINIC | Age: 70
End: 2025-03-04
Payer: COMMERCIAL

## 2025-03-04 VITALS
TEMPERATURE: 98.7 F | DIASTOLIC BLOOD PRESSURE: 86 MMHG | WEIGHT: 215.5 LBS | BODY MASS INDEX: 29.19 KG/M2 | HEART RATE: 93 BPM | OXYGEN SATURATION: 98 % | SYSTOLIC BLOOD PRESSURE: 126 MMHG | HEIGHT: 72 IN

## 2025-03-04 DIAGNOSIS — E55.9 VITAMIN D DEFICIENCY: ICD-10-CM

## 2025-03-04 DIAGNOSIS — E78.00 PURE HYPERCHOLESTEROLEMIA: ICD-10-CM

## 2025-03-04 DIAGNOSIS — I10 PRIMARY HYPERTENSION: ICD-10-CM

## 2025-03-04 DIAGNOSIS — E11.69 TYPE 2 DIABETES MELLITUS WITH HYPERLIPIDEMIA: ICD-10-CM

## 2025-03-04 DIAGNOSIS — E78.5 TYPE 2 DIABETES MELLITUS WITH HYPERLIPIDEMIA: ICD-10-CM

## 2025-03-04 DIAGNOSIS — E29.1 HYPOGONADISM IN MALE: Primary | ICD-10-CM

## 2025-03-04 LAB
BASOPHILS # BLD AUTO: 0.04 10*3/MM3 (ref 0–0.2)
BASOPHILS NFR BLD AUTO: 0.7 % (ref 0–1.5)
DEPRECATED RDW RBC AUTO: 39.3 FL (ref 37–54)
EOSINOPHIL # BLD AUTO: 0.16 10*3/MM3 (ref 0–0.4)
EOSINOPHIL NFR BLD AUTO: 2.8 % (ref 0.3–6.2)
ERYTHROCYTE [DISTWIDTH] IN BLOOD BY AUTOMATED COUNT: 11.9 % (ref 12.3–15.4)
HCT VFR BLD AUTO: 50.8 % (ref 37.5–51)
HGB BLD-MCNC: 17.3 G/DL (ref 13–17.7)
IMM GRANULOCYTES # BLD AUTO: 0.02 10*3/MM3 (ref 0–0.05)
IMM GRANULOCYTES NFR BLD AUTO: 0.3 % (ref 0–0.5)
LYMPHOCYTES # BLD AUTO: 1.22 10*3/MM3 (ref 0.7–3.1)
LYMPHOCYTES NFR BLD AUTO: 21 % (ref 19.6–45.3)
MCH RBC QN AUTO: 31.1 PG (ref 26.6–33)
MCHC RBC AUTO-ENTMCNC: 34.1 G/DL (ref 31.5–35.7)
MCV RBC AUTO: 91.4 FL (ref 79–97)
MONOCYTES # BLD AUTO: 0.68 10*3/MM3 (ref 0.1–0.9)
MONOCYTES NFR BLD AUTO: 11.7 % (ref 5–12)
NEUTROPHILS NFR BLD AUTO: 3.69 10*3/MM3 (ref 1.7–7)
NEUTROPHILS NFR BLD AUTO: 63.5 % (ref 42.7–76)
NRBC BLD AUTO-RTO: 0 /100 WBC (ref 0–0.2)
PLATELET # BLD AUTO: 213 10*3/MM3 (ref 140–450)
PMV BLD AUTO: 10.6 FL (ref 6–12)
PROLACTIN SERPL-MCNC: 9.97 NG/ML (ref 4.04–15.2)
PSA SERPL-MCNC: 1.6 NG/ML (ref 0–4)
RBC # BLD AUTO: 5.56 10*6/MM3 (ref 4.14–5.8)
TESTOST SERPL-MCNC: 658 NG/DL (ref 193–740)
TSH SERPL DL<=0.05 MIU/L-ACNC: 3.64 UIU/ML (ref 0.27–4.2)
WBC NRBC COR # BLD AUTO: 5.81 10*3/MM3 (ref 3.4–10.8)

## 2025-03-04 PROCEDURE — 99214 OFFICE O/P EST MOD 30 MIN: CPT | Performed by: STUDENT IN AN ORGANIZED HEALTH CARE EDUCATION/TRAINING PROGRAM

## 2025-03-04 PROCEDURE — 84403 ASSAY OF TOTAL TESTOSTERONE: CPT | Performed by: STUDENT IN AN ORGANIZED HEALTH CARE EDUCATION/TRAINING PROGRAM

## 2025-03-04 PROCEDURE — 84146 ASSAY OF PROLACTIN: CPT | Performed by: STUDENT IN AN ORGANIZED HEALTH CARE EDUCATION/TRAINING PROGRAM

## 2025-03-04 PROCEDURE — 84443 ASSAY THYROID STIM HORMONE: CPT | Performed by: STUDENT IN AN ORGANIZED HEALTH CARE EDUCATION/TRAINING PROGRAM

## 2025-03-04 PROCEDURE — 84153 ASSAY OF PSA TOTAL: CPT | Performed by: STUDENT IN AN ORGANIZED HEALTH CARE EDUCATION/TRAINING PROGRAM

## 2025-03-04 PROCEDURE — 85025 COMPLETE CBC W/AUTO DIFF WBC: CPT | Performed by: STUDENT IN AN ORGANIZED HEALTH CARE EDUCATION/TRAINING PROGRAM

## 2025-03-04 RX ORDER — ROSUVASTATIN CALCIUM 40 MG/1
40 TABLET, COATED ORAL DAILY
Qty: 90 TABLET | Refills: 1 | Status: SHIPPED | OUTPATIENT
Start: 2025-03-04

## 2025-03-04 RX ORDER — LIRAGLUTIDE 6 MG/ML
1.2 INJECTION SUBCUTANEOUS DAILY
Qty: 18 ML | Refills: 1 | Status: SHIPPED | OUTPATIENT
Start: 2025-03-04

## 2025-03-04 RX ORDER — AMLODIPINE BESYLATE 10 MG/1
10 TABLET ORAL DAILY
Qty: 90 TABLET | Refills: 1 | Status: SHIPPED | OUTPATIENT
Start: 2025-03-04

## 2025-03-04 RX ORDER — LISINOPRIL 40 MG/1
40 TABLET ORAL DAILY
Qty: 90 TABLET | Refills: 1 | Status: SHIPPED | OUTPATIENT
Start: 2025-03-04

## 2025-03-04 RX ORDER — TESTOSTERONE ENANTHATE 50 MG/.5ML
50 INJECTION SUBCUTANEOUS WEEKLY
Qty: 6 ML | Refills: 1 | Status: SHIPPED | OUTPATIENT
Start: 2025-03-04

## 2025-03-04 RX ORDER — HYDROCHLOROTHIAZIDE 25 MG/1
25 TABLET ORAL DAILY
Qty: 90 TABLET | Refills: 1 | Status: SHIPPED | OUTPATIENT
Start: 2025-03-04

## 2025-03-04 RX ORDER — CHOLECALCIFEROL TAB 125 MCG (5000 UNIT) 125 MCG (5000 UT)
1 TAB DAILY
Qty: 90 TABLET | Refills: 1 | Status: SHIPPED | OUTPATIENT
Start: 2025-03-04

## 2025-03-04 NOTE — PROGRESS NOTES
"Chief Complaint  Follow-up    Subjective      Rik Zayas is a 69 y.o. male who presents to [facility]     History of Present Illness  The patient is a 69-year-old male here for follow-up on diabetes and hypogonadism.    Type II diabetes  - He obtained his Victoza refill and reports no issues with Jardiance.  - He had an excess supply of needles this time but usually needs a prescription for them.  - He gets his needles from [facility].  - He approved an early refill of Jardiance, resulting in an unopened bottle at home.  - No changes in his foot condition; numbness and tingling remain.  - He takes 5000 units of vitamin D daily.    Hypogonadism  - He seeks a refill of Xyosted, previously prescribed by previous PCP  - He exhausted his supply last weekend and requests a 3-month supply due to cost concerns.  - He discontinued Flomax due to interference with ejaculation and reports no increased urinary issues.    Blood Pressure  - He monitors his blood pressure intermittently and reports satisfactory readings but desires a lower diastolic reading, recorded as 78 today.  -Currently taking lisinopril 40 mg daily, hydrochlorothiazide 25 mg daily, amlodipine 10 mg daily  - Custom-made compression socks from [employer] in [place] have reduced his leg swelling.  - He got his compression socks 3 years ago.    Supplemental information: None.    MEDICATIONS  Current: Victoza, Jardiance, amlodipine, hydrochlorothiazide, lisinopril, Xyzal, vitamin D supplement.  Discontinued: Flomax.       Objective   Vital Signs:   Vitals:    03/04/25 0812   BP: 126/86   BP Location: Left arm   Patient Position: Sitting   Pulse: 93   Temp: 98.7 °F (37.1 °C)   TempSrc: Oral   SpO2: 98%   Weight: 97.8 kg (215 lb 8 oz)   Height: 182.9 cm (72\")     Body mass index is 29.23 kg/m².    Wt Readings from Last 3 Encounters:   03/04/25 97.8 kg (215 lb 8 oz)   12/07/24 99.3 kg (219 lb) "   12/03/24 99.5 kg (219 lb 6.4 oz)     BP Readings from Last 3 Encounters:   03/04/25 126/86   12/07/24 115/68   12/03/24 130/80       Health Maintenance   Topic Date Due    URINE MICROALBUMIN-CREATININE RATIO (uACR)  Never done    Pneumococcal Vaccine 50+ (2 of 2 - PPSV23) 09/23/2020    ANNUAL PHYSICAL  Never done    COVID-19 Vaccine (5 - 2024-25 season) 09/01/2024    HEMOGLOBIN A1C  05/06/2025    LIPID PANEL  11/06/2025    BMI FOLLOWUP  11/06/2025    DIABETIC EYE EXAM  11/30/2025    DIABETIC FOOT EXAM  12/03/2025    COLORECTAL CANCER SCREENING  01/19/2026    TDAP/TD VACCINES (2 - Td or Tdap) 06/23/2031    HEPATITIS C SCREENING  Completed    INFLUENZA VACCINE  Completed    ZOSTER VACCINE  Completed       Physical Exam  HENT:      Head: Normocephalic and atraumatic.      Nose: Nose normal.      Mouth/Throat:      Mouth: Mucous membranes are moist.   Eyes:      Extraocular Movements: Extraocular movements intact.      Conjunctiva/sclera: Conjunctivae normal.   Cardiovascular:      Rate and Rhythm: Normal rate and regular rhythm.      Heart sounds: No murmur heard.     No friction rub. No gallop.   Pulmonary:      Effort: No respiratory distress.      Breath sounds: No wheezing, rhonchi or rales.   Abdominal:      General: Abdomen is flat. There is no distension.   Musculoskeletal:         General: No swelling.      Cervical back: Neck supple.      Right lower leg: No edema.      Left lower leg: No edema.      Comments: Compression socks seen   Skin:     General: Skin is warm and dry.   Neurological:      General: No focal deficit present.      Mental Status: He is alert and oriented to person, place, and time.   Psychiatric:         Mood and Affect: Mood normal.         Behavior: Behavior normal.         Thought Content: Thought content normal.         Judgment: Judgment normal.          Physical Exam      Result Review :  The following data was reviewed by: Zurdo Riley DO on 03/04/2025:    No Images in the past  120 days found..     Results  Testosterone level was 638 in November. Total red blood cells were at the upper end of normal. A1c and cholesterol levels were good.       Procedures          Diagnoses and all orders for this visit:    1. Hypogonadism in male (Primary)  -     Xyosted 50 MG/0.5ML solution auto-injector; Inject 50 mg under the skin into the appropriate area as directed 1 (One) Time Per Week.  Dispense: 6 mL; Refill: 1  -     CBC & Differential  -     PSA DIAGNOSTIC  -     Testosterone  -     Prolactin  -     TSH Rfx On Abnormal To Free T4    2. Type 2 diabetes mellitus with hyperlipidemia  -     Liraglutide (Victoza) 18 MG/3ML solution pen-injector injection; Inject 1.2 mg under the skin into the appropriate area as directed Daily.  Dispense: 18 mL; Refill: 1    3. Primary hypertension  -     amLODIPine (NORVASC) 10 MG tablet; Take 1 tablet by mouth Daily.  Dispense: 90 tablet; Refill: 1  -     hydroCHLOROthiazide 25 MG tablet; Take 1 tablet by mouth Daily.  Dispense: 90 tablet; Refill: 1  -     lisinopril (PRINIVIL,ZESTRIL) 40 MG tablet; Take 1 tablet by mouth Daily.  Dispense: 90 tablet; Refill: 1    4. Pure hypercholesterolemia  -     rosuvastatin (CRESTOR) 40 MG tablet; Take 1 tablet by mouth Daily.  Dispense: 90 tablet; Refill: 1    5. Vitamin D deficiency  -     Natural Vitamin D-3 125 MCG (5000 UT) tablet; Take 1 tablet by mouth Daily.  Dispense: 90 tablet; Refill: 1         Assessment & Plan  1. Diabetes Mellitus.  A1c levels are commendable (6.1), indicating effective management. On Victoza 1.2 mg daily and Jardiance 10 mg daily, which are working well. Prescription for Victoza sent to [facility]. Continue current regimen.    2. Hypogonadism.  Testosterone levels were normal in November 2024. Total red blood cell count was at the upper limit of normal (hemoglobin 17.3), requiring further monitoring. On Xyosted. Prescription for Xyosted sent to [facility]. Blood work today to assess red blood  "cell count, prostate health, testosterone, prolactin, and thyroid function. Informed about risks of prolonged testosterone treatment, including stroke and heart attack. Signed controlled substance agreement.    3. Hypertension.  Blood pressure readings satisfactory, consistently below 140/90. Prescriptions for amlodipine, hydrochlorothiazide, and lisinopril sent to [facility]. Continue current regimen.    4. Medication Management.  Prescriptions for statin and vitamin D supplement (5000 units daily) sent to [facility].  Previous vitamin D from 11/2024 was 60.3              FOLLOW UP  Return in about 3 months (around 6/4/2025) for hypogonadism.  Patient was given instructions and counseling regarding his condition or for health maintenance advice. Please see specific information pulled into the AVS if appropriate.     Patient or patient representative verbalized consent for the use of Ambient Listening during the visit with  Zurdo Riley DO for chart documentation. 3/4/2025  09:03 EST    Zurdo Riley DO  03/04/25  08:46 EST    CURRENT & DISCONTINUED MEDICATIONS  Current Outpatient Medications   Medication Instructions    acetaminophen (TYLENOL) 500 MG tablet 2 tablets, Oral, Every 6 Hours PRN    amLODIPine (NORVASC) 10 mg, Oral, Daily    empagliflozin (JARDIANCE) 10 mg, Oral, Daily    fluticasone (FLONASE) 50 MCG/ACT nasal spray SPRAY 2 SPRAYS INTO THE NOSTRIL DAILY AS DIRECTED    hydroCHLOROthiazide 25 mg, Oral, Daily    ibuprofen (ADVIL,MOTRIN) 200 MG tablet Take two as needed    Insulin Pen Needle (Pen Needles 5/16\") 31G X 8 MM misc Use as directed with insulin pen Dx E11.9    Insulin Pen Needle 31G X 5 MM misc Use as directed with medication    Liraglutide (VICTOZA) 1.2 mg, Subcutaneous, Daily    lisinopril (PRINIVIL,ZESTRIL) 40 mg, Oral, Daily    Natural Vitamin D-3 125 mcg, Oral, Daily    rosuvastatin (CRESTOR) 40 mg, Oral, Daily    Xyosted 50 mg, Subcutaneous, Weekly       Medications Discontinued During " This Encounter   Medication Reason    predniSONE (DELTASONE) 10 MG (21) dose pack     tamsulosin (FLOMAX) 0.4 MG capsule 24 hr capsule     amLODIPine (NORVASC) 10 MG tablet Reorder    hydroCHLOROthiazide 25 MG tablet Reorder    lisinopril (PRINIVIL,ZESTRIL) 40 MG tablet Reorder    rosuvastatin (CRESTOR) 40 MG tablet Reorder    Natural Vitamin D-3 125 MCG (5000 UT) tablet Reorder    Xyosted 50 MG/0.5ML solution auto-injector Reorder    Liraglutide (Victoza) 18 MG/3ML solution pen-injector injection Reorder

## 2025-03-29 ENCOUNTER — TELEMEDICINE (OUTPATIENT)
Dept: FAMILY MEDICINE CLINIC | Facility: TELEHEALTH | Age: 70
End: 2025-03-29
Payer: COMMERCIAL

## 2025-03-29 VITALS — TEMPERATURE: 97.1 F | HEART RATE: 84 BPM

## 2025-03-29 DIAGNOSIS — J01.40 ACUTE PANSINUSITIS, RECURRENCE NOT SPECIFIED: Primary | ICD-10-CM

## 2025-03-29 PROCEDURE — 99213 OFFICE O/P EST LOW 20 MIN: CPT | Performed by: NURSE PRACTITIONER

## 2025-03-29 RX ORDER — PREDNISONE 10 MG/1
TABLET ORAL
Qty: 21 TABLET | Refills: 0 | Status: SHIPPED | OUTPATIENT
Start: 2025-03-29

## 2025-03-29 RX ORDER — FLUTICASONE PROPIONATE 50 MCG
2 SPRAY, SUSPENSION (ML) NASAL DAILY
Qty: 16 G | Refills: 0 | Status: SHIPPED | OUTPATIENT
Start: 2025-03-29

## 2025-03-29 NOTE — PROGRESS NOTES
CHIEF COMPLAINT  Chief Complaint   Patient presents with    Sinusitis         HPI  Rik Zayas is a 69 y.o. male  presents with complaint of sinusitis for 12-14 days. The symptoms have worsened with low grade intermittent fever. He typically takes Augmentin and a steroid pack. He is out of Flonase and has not taken it for a while.     Review of Systems   Constitutional:  Positive for fatigue and fever. Negative for chills and diaphoresis.   HENT:  Positive for congestion, postnasal drip, sinus pressure and sinus pain. Negative for rhinorrhea, sneezing and sore throat.    Respiratory:  Positive for cough (due to post nasal drip).    Gastrointestinal:  Negative for diarrhea, nausea and vomiting.   Musculoskeletal:  Positive for myalgias.   Neurological:  Positive for headaches.       Past Medical History:   Diagnosis Date    Diabetes mellitus     Hyperlipemia     Hypertension        Family History   Problem Relation Age of Onset    Diabetes Mother     Diabetes Father        Social History     Socioeconomic History    Marital status:    Tobacco Use    Smoking status: Never     Passive exposure: Never    Smokeless tobacco: Never   Vaping Use    Vaping status: Never Used   Substance and Sexual Activity    Alcohol use: Not Currently     Comment: soc    Drug use: Never    Sexual activity: Defer         There were no vitals taken for this visit.    PHYSICAL EXAM  Physical Exam   Constitutional: He is oriented to person, place, and time. He appears well-developed and well-nourished. He does not have a sickly appearance. He does not appear ill. No distress.   HENT:   Head: Normocephalic and atraumatic.   Eyes: EOM are normal.   Neck: Neck normal appearance.  Pulmonary/Chest: Effort normal.  No respiratory distress.  Neurological: He is alert and oriented to person, place, and time.   Skin: Skin is dry.   Psychiatric: He has a normal mood and affect.           Diagnoses and all orders for this visit:    1. Acute  pansinusitis, recurrence not specified (Primary)    Other orders  -     amoxicillin-clavulanate (AUGMENTIN) 875-125 MG per tablet; Take 1 tablet by mouth 2 (Two) Times a Day for 7 days.  Dispense: 14 tablet; Refill: 0  -     predniSONE (DELTASONE) 10 MG (21) dose pack; Use as directed on package  Dispense: 21 tablet; Refill: 0  -     fluticasone (FLONASE) 50 MCG/ACT nasal spray; Administer 2 sprays into the nostril(s) as directed by provider Daily.  Dispense: 16 g; Refill: 0    Monitor your glucose closely while taking steroids.     Mode of visit: Video   Myself and Rik Zayas participated in this visit. The patient is located in [place], [place]. I am located in [place], [place]. Mychart and Tyto were utilized.   You have chosen to receive care through a telehealth visit.     Does the patient consent to use a video/audio connection for your medical care today? Yes       Note Disclaimer: At [facility], we believe that sharing information builds trust and better   relationships. You are receiving this note because you recently visited [facility]. It is possible you   will see health information before a provider has talked with you about it. This kind of information can   be easy to misunderstand. To help you fully understand what it means for your health, we urge you to   discuss this note with your provider.    Netta Solomon, JAIR  03/29/2025  09:40 EDT

## 2025-03-29 NOTE — PATIENT INSTRUCTIONS
Monitor your glucose closely while taking steroids.     Drink plenty of water  Over the counter pain relievers okay   If symptoms do not improve in 3-5 days follow up with your primary care provider or urgent care  If symptoms worsen follow up with urgent care or the emergency room      Sinus Infection, Adult  A sinus infection is soreness and swelling (inflammation) of your sinuses. Sinuses are hollow spaces in the bones around your face. They are located:  Around your eyes.  In the middle of your forehead.  Behind your nose.  In your cheekbones.  Your sinuses and nasal passages are lined with a fluid called mucus. Mucus drains out of your sinuses. Swelling can trap mucus in your sinuses. This lets germs (bacteria, virus, or fungus) grow, which leads to infection. Most of the time, this condition is caused by a virus.  What are the causes?  Allergies.  Asthma.  Germs.  Things that block your nose or sinuses.  Growths in the nose (nasal polyps).  Chemicals or irritants in the air.  A fungus. This is rare.  What increases the risk?  Having a weak body defense system (immune system).  Doing a lot of swimming or diving.  Using nasal sprays too much.  Smoking.  What are the signs or symptoms?  The main symptoms of this condition are pain and a feeling of pressure around the sinuses. Other symptoms include:  Stuffy nose (congestion). This may make it hard to breathe through your nose.  Runny nose (drainage).  Soreness, swelling, and warmth in the sinuses.  A cough that may get worse at night.  Being unable to smell and taste.  Mucus that collects in the throat or the back of the nose (postnasal drip). This may cause a sore throat or bad breath.  Being very tired (fatigued).  A fever.  How is this diagnosed?  Your symptoms.  Your medical history.  A physical exam.  Tests to find out if your condition is short-term (acute) or long-term (chronic). Your doctor may:  Check your nose for growths (polyps).  Check your sinuses  using a tool that has a light on one end (endoscope).  Check for allergies or germs.  Do imaging tests, such as an MRI or CT scan.  How is this treated?  Treatment for this condition depends on the cause and whether it is short-term or long-term.  If caused by a virus, your symptoms should go away on their own within 10 days. You may be given medicines to relieve symptoms. They include:  Medicines that shrink swollen tissue in the nose.  A spray that treats swelling of the nostrils.  Rinses that help get rid of thick mucus in your nose (nasal saline washes).  Medicines that treat allergies (antihistamines).  Over-the-counter pain relievers.  If caused by bacteria, your doctor may wait to see if you will get better without treatment. You may be given antibiotic medicine if you have:  A very bad infection.  A weak body defense system.  If caused by growths in the nose, surgery may be needed.  Follow these instructions at home:  Medicines  Take, use, or apply over-the-counter and prescription medicines only as told by your doctor. These may include nasal sprays.  If you were prescribed an antibiotic medicine, take it as told by your doctor. Do not stop taking it even if you start to feel better.  Hydrate and humidify    Drink enough water to keep your pee (urine) pale yellow.  Use a cool mist humidifier to keep the humidity level in your home above 50%.  Breathe in steam for 10-15 minutes, 3-4 times a day, or as told by your doctor. You can do this in the bathroom while a hot shower is running.  Try not to spend time in cool or dry air.  Rest  Rest as much as you can.  Sleep with your head raised (elevated).  Make sure you get enough sleep each night.  General instructions    Put a warm, moist washcloth on your face 3-4 times a day, or as often as told by your doctor.  Use nasal saline washes as often as told by your doctor.  Wash your hands often with soap and water. If you cannot use soap and water, use hand  .  Do not smoke. Avoid being around people who are smoking (secondhand smoke).  Keep all follow-up visits.  Contact a doctor if:  You have a fever.  Your symptoms get worse.  Your symptoms do not get better within 10 days.  Get help right away if:  You have a very bad headache.  You cannot stop vomiting.  You have very bad pain or swelling around your face or eyes.  You have trouble seeing.  You feel confused.  Your neck is stiff.  You have trouble breathing.  These symptoms may be an emergency. Get help right away. Call 911.  Do not wait to see if the symptoms will go away.  Do not drive yourself to the hospital.  Summary  A sinus infection is swelling of your sinuses. Sinuses are hollow spaces in the bones around your face.  This condition is caused by tissues in your nose that become inflamed or swollen. This traps germs. These can lead to infection.  If you were prescribed an antibiotic medicine, take it as told by your doctor. Do not stop taking it even if you start to feel better.  Keep all follow-up visits.  This information is not intended to replace advice given to you by your health care provider. Make sure you discuss any questions you have with your health care provider.  Document Revised: 11/22/2022 Document Reviewed: 11/22/2022  Elsevier Patient Education © 2024 [employer]

## 2025-06-02 DIAGNOSIS — E29.1 HYPOGONADISM IN MALE: ICD-10-CM

## 2025-06-04 RX ORDER — TESTOSTERONE ENANTHATE 50 MG/.5ML
50 INJECTION SUBCUTANEOUS WEEKLY
Qty: 6 ML | Refills: 0 | Status: SHIPPED | OUTPATIENT
Start: 2025-06-04 | End: 2025-06-06 | Stop reason: SDUPTHER

## 2025-06-04 NOTE — TELEPHONE ENCOUNTER
3 months ago CBC normal, testosterone normal, prolactin normal and PSA normal.  Patient has an appointment on 6/10/2025 and we will repeat blood work at that time.  Will refill medicine today.  PDMP reviewed and appropriate

## 2025-06-06 DIAGNOSIS — E29.1 HYPOGONADISM IN MALE: ICD-10-CM

## 2025-06-06 RX ORDER — TESTOSTERONE ENANTHATE 50 MG/.5ML
50 INJECTION SUBCUTANEOUS WEEKLY
Qty: 6 ML | Refills: 0 | Status: CANCELLED | OUTPATIENT
Start: 2025-06-06

## 2025-06-06 RX ORDER — TESTOSTERONE ENANTHATE 50 MG/.5ML
50 INJECTION SUBCUTANEOUS WEEKLY
Qty: 6 ML | Refills: 0 | Status: SHIPPED | OUTPATIENT
Start: 2025-06-06

## 2025-06-10 ENCOUNTER — PRIOR AUTHORIZATION (OUTPATIENT)
Dept: FAMILY MEDICINE CLINIC | Facility: CLINIC | Age: 70
End: 2025-06-10

## 2025-06-10 ENCOUNTER — OFFICE VISIT (OUTPATIENT)
Dept: FAMILY MEDICINE CLINIC | Facility: CLINIC | Age: 70
End: 2025-06-10
Payer: COMMERCIAL

## 2025-06-10 VITALS — WEIGHT: 230 LBS | HEIGHT: 72 IN | BODY MASS INDEX: 31.15 KG/M2

## 2025-06-10 DIAGNOSIS — E11.69 TYPE 2 DIABETES MELLITUS WITH HYPERLIPIDEMIA: Primary | ICD-10-CM

## 2025-06-10 DIAGNOSIS — E78.5 TYPE 2 DIABETES MELLITUS WITH HYPERLIPIDEMIA: Primary | ICD-10-CM

## 2025-06-10 DIAGNOSIS — I10 PRIMARY HYPERTENSION: ICD-10-CM

## 2025-06-10 DIAGNOSIS — H10.12 ALLERGIC CONJUNCTIVITIS OF LEFT EYE: ICD-10-CM

## 2025-06-10 DIAGNOSIS — J30.1 SEASONAL ALLERGIC RHINITIS DUE TO POLLEN: ICD-10-CM

## 2025-06-10 DIAGNOSIS — E78.00 PURE HYPERCHOLESTEROLEMIA: ICD-10-CM

## 2025-06-10 DIAGNOSIS — E55.9 VITAMIN D DEFICIENCY: ICD-10-CM

## 2025-06-10 DIAGNOSIS — L98.9 SKIN LESION: ICD-10-CM

## 2025-06-10 DIAGNOSIS — E29.1 HYPOGONADISM IN MALE: ICD-10-CM

## 2025-06-10 LAB
ALBUMIN SERPL-MCNC: 4.5 G/DL (ref 3.5–5.2)
ALBUMIN/GLOB SERPL: 1.6 G/DL
ALP SERPL-CCNC: 91 U/L (ref 39–117)
ALT SERPL W P-5'-P-CCNC: 36 U/L (ref 1–41)
ANION GAP SERPL CALCULATED.3IONS-SCNC: 12 MMOL/L (ref 5–15)
AST SERPL-CCNC: 30 U/L (ref 1–40)
BASOPHILS # BLD AUTO: 0.06 10*3/MM3 (ref 0–0.2)
BASOPHILS NFR BLD AUTO: 1 % (ref 0–1.5)
BILIRUB SERPL-MCNC: 0.6 MG/DL (ref 0–1.2)
BUN SERPL-MCNC: 20 MG/DL (ref 8–23)
BUN/CREAT SERPL: 16.3 (ref 7–25)
CALCIUM SPEC-SCNC: 10.6 MG/DL (ref 8.6–10.5)
CHLORIDE SERPL-SCNC: 100 MMOL/L (ref 98–107)
CHOLEST SERPL-MCNC: 164 MG/DL (ref 0–200)
CO2 SERPL-SCNC: 24 MMOL/L (ref 22–29)
CREAT SERPL-MCNC: 1.23 MG/DL (ref 0.76–1.27)
DEPRECATED RDW RBC AUTO: 40.9 FL (ref 37–54)
EGFRCR SERPLBLD CKD-EPI 2021: 63.2 ML/MIN/1.73
EOSINOPHIL # BLD AUTO: 0.26 10*3/MM3 (ref 0–0.4)
EOSINOPHIL NFR BLD AUTO: 4.3 % (ref 0.3–6.2)
ERYTHROCYTE [DISTWIDTH] IN BLOOD BY AUTOMATED COUNT: 12.3 % (ref 12.3–15.4)
GLOBULIN UR ELPH-MCNC: 2.8 GM/DL
GLUCOSE SERPL-MCNC: 161 MG/DL (ref 65–99)
HBA1C MFR BLD: 6.7 % (ref 4.8–5.6)
HCT VFR BLD AUTO: 49.2 % (ref 37.5–51)
HDLC SERPL-MCNC: 62 MG/DL (ref 40–60)
HGB BLD-MCNC: 16.8 G/DL (ref 13–17.7)
IMM GRANULOCYTES # BLD AUTO: 0.02 10*3/MM3 (ref 0–0.05)
IMM GRANULOCYTES NFR BLD AUTO: 0.3 % (ref 0–0.5)
LDLC SERPL CALC-MCNC: 57 MG/DL (ref 0–100)
LDLC/HDLC SERPL: 0.71 {RATIO}
LYMPHOCYTES # BLD AUTO: 1.25 10*3/MM3 (ref 0.7–3.1)
LYMPHOCYTES NFR BLD AUTO: 20.6 % (ref 19.6–45.3)
MCH RBC QN AUTO: 31.2 PG (ref 26.6–33)
MCHC RBC AUTO-ENTMCNC: 34.1 G/DL (ref 31.5–35.7)
MCV RBC AUTO: 91.4 FL (ref 79–97)
MONOCYTES # BLD AUTO: 0.62 10*3/MM3 (ref 0.1–0.9)
MONOCYTES NFR BLD AUTO: 10.2 % (ref 5–12)
NEUTROPHILS NFR BLD AUTO: 3.86 10*3/MM3 (ref 1.7–7)
NEUTROPHILS NFR BLD AUTO: 63.6 % (ref 42.7–76)
NRBC BLD AUTO-RTO: 0 /100 WBC (ref 0–0.2)
PLATELET # BLD AUTO: 218 10*3/MM3 (ref 140–450)
PMV BLD AUTO: 10.8 FL (ref 6–12)
POTASSIUM SERPL-SCNC: 4.2 MMOL/L (ref 3.5–5.2)
PROLACTIN SERPL-MCNC: 8.8 NG/ML (ref 4.04–15.2)
PROT SERPL-MCNC: 7.3 G/DL (ref 6–8.5)
RBC # BLD AUTO: 5.38 10*6/MM3 (ref 4.14–5.8)
SODIUM SERPL-SCNC: 136 MMOL/L (ref 136–145)
TESTOST SERPL-MCNC: 587 NG/DL (ref 193–740)
TRIGL SERPL-MCNC: 291 MG/DL (ref 0–150)
TSH SERPL DL<=0.05 MIU/L-ACNC: 3.5 UIU/ML (ref 0.27–4.2)
VLDLC SERPL-MCNC: 45 MG/DL (ref 5–40)
WBC NRBC COR # BLD AUTO: 6.07 10*3/MM3 (ref 3.4–10.8)

## 2025-06-10 PROCEDURE — 83036 HEMOGLOBIN GLYCOSYLATED A1C: CPT | Performed by: STUDENT IN AN ORGANIZED HEALTH CARE EDUCATION/TRAINING PROGRAM

## 2025-06-10 PROCEDURE — 80050 GENERAL HEALTH PANEL: CPT | Performed by: STUDENT IN AN ORGANIZED HEALTH CARE EDUCATION/TRAINING PROGRAM

## 2025-06-10 PROCEDURE — 84146 ASSAY OF PROLACTIN: CPT | Performed by: STUDENT IN AN ORGANIZED HEALTH CARE EDUCATION/TRAINING PROGRAM

## 2025-06-10 PROCEDURE — 84403 ASSAY OF TOTAL TESTOSTERONE: CPT | Performed by: STUDENT IN AN ORGANIZED HEALTH CARE EDUCATION/TRAINING PROGRAM

## 2025-06-10 PROCEDURE — 80061 LIPID PANEL: CPT | Performed by: STUDENT IN AN ORGANIZED HEALTH CARE EDUCATION/TRAINING PROGRAM

## 2025-06-10 RX ORDER — ROSUVASTATIN CALCIUM 40 MG/1
40 TABLET, COATED ORAL DAILY
Qty: 90 TABLET | Refills: 1 | Status: SHIPPED | OUTPATIENT
Start: 2025-06-10

## 2025-06-10 RX ORDER — CHOLECALCIFEROL TAB 125 MCG (5000 UNIT) 125 MCG (5000 UT)
1 TAB DAILY
Qty: 90 TABLET | Refills: 1 | Status: SHIPPED | OUTPATIENT
Start: 2025-06-10

## 2025-06-10 RX ORDER — TRIAMCINOLONE ACETONIDE 1 MG/G
1 OINTMENT TOPICAL 2 TIMES DAILY
Qty: 15 G | Refills: 0 | Status: SHIPPED | OUTPATIENT
Start: 2025-06-10

## 2025-06-10 RX ORDER — AMLODIPINE BESYLATE 10 MG/1
10 TABLET ORAL DAILY
Qty: 90 TABLET | Refills: 1 | Status: SHIPPED | OUTPATIENT
Start: 2025-06-10

## 2025-06-10 RX ORDER — HYDROCHLOROTHIAZIDE 25 MG/1
25 TABLET ORAL DAILY
Qty: 90 TABLET | Refills: 1 | Status: SHIPPED | OUTPATIENT
Start: 2025-06-10

## 2025-06-10 RX ORDER — OLOPATADINE HYDROCHLORIDE 1 MG/ML
1 SOLUTION OPHTHALMIC 2 TIMES DAILY
Qty: 5 ML | Refills: 0 | Status: SHIPPED | OUTPATIENT
Start: 2025-06-10

## 2025-06-10 RX ORDER — PEN NEEDLE, DIABETIC 31 GX5/16"
NEEDLE, DISPOSABLE MISCELLANEOUS
Qty: 100 EACH | Refills: 5 | Status: SHIPPED | OUTPATIENT
Start: 2025-06-10

## 2025-06-10 RX ORDER — TESTOSTERONE ENANTHATE 50 MG/.5ML
50 INJECTION SUBCUTANEOUS WEEKLY
Qty: 2 ML | Refills: 2 | Status: SHIPPED | OUTPATIENT
Start: 2025-06-10

## 2025-06-10 RX ORDER — OLOPATADINE HYDROCHLORIDE 1 MG/ML
1 SOLUTION OPHTHALMIC 2 TIMES DAILY
Qty: 5 ML | Refills: 0 | Status: SHIPPED | OUTPATIENT
Start: 2025-06-10 | End: 2025-06-10

## 2025-06-10 RX ORDER — LISINOPRIL 40 MG/1
40 TABLET ORAL DAILY
Qty: 90 TABLET | Refills: 1 | Status: SHIPPED | OUTPATIENT
Start: 2025-06-10

## 2025-06-10 NOTE — TELEPHONE ENCOUNTER
Prior Authorization for Xyosted PA APPROVED     Approved today by [facility] 2017  Your PA request has been approved. Additional information will be provided in the approval communication. (Message 1145)  Effective Date: 5/11/2025  Authorization Expiration Date: 6/10/2026    This is to inform you that your Prior Authorization request for the above member’s Xyosted  50MG/0.5ML SC SOAJ has been approved. If you are changing the member's therapy the previously  approved therapy will be canceled and replaced.  The authorization is valid from 05/11/2025 through 06/10/2026. A letter of explanation will also be  mailed to the patient.    Patient notified of approval.

## 2025-06-10 NOTE — PROGRESS NOTES
"Chief Complaint  Follow-up    Subjective      Rik Zayas is a 70 y.o. male who presents to [facility]     History of Present Illness  The patient presents for a follow-up on testosterone supplementation.    Eye Issues  - Excessive tearing in the left eye for 2.5 weeks  - No discharge or vision changes  - Suspects conjunctivitis  - Itching in both eyes  - Frequent rubbing of the left eye  - Taking loratadine daily and using Flonase  - No nasal symptoms  - Recalls frequent sneezing earlier in the season    Skin Lesions  - Several skin lesions present for months  - Periods of inflammation and convexity  - Skin lotion alleviates itching and inflammation    Supplemental information: He is on Xyosted, Jardiance, and liraglutide, requesting refills and reporting issues with prescriptions being sent to the wrong pharmacies.       Objective   Vital Signs:   Vitals:    06/10/25 0815   Weight: 104 kg (230 lb)   Height: 182.9 cm (72\")     Body mass index is 31.19 kg/m².    Wt Readings from Last 3 Encounters:   06/10/25 104 kg (230 lb)   03/04/25 97.8 kg (215 lb 8 oz)   12/07/24 99.3 kg (219 lb)     BP Readings from Last 3 Encounters:   03/04/25 126/86   12/07/24 115/68   12/03/24 130/80       Health Maintenance   Topic Date Due    Pneumococcal Vaccine 50+ (2 of 2 - PPSV23) 09/23/2020    ANNUAL PHYSICAL  Never done    COVID-19 Vaccine (5 - 2024-25 season) 09/01/2024    HEMOGLOBIN A1C  05/06/2025    INFLUENZA VACCINE  07/01/2025    LIPID PANEL  11/06/2025    URINE MICROALBUMIN-CREATININE RATIO (uACR)  11/06/2025    DIABETIC EYE EXAM  11/30/2025    DIABETIC FOOT EXAM  12/03/2025    COLORECTAL CANCER SCREENING  01/19/2026    TDAP/TD VACCINES (2 - Td or Tdap) 06/23/2031    HEPATITIS C SCREENING  Completed    ZOSTER VACCINE  Completed       Physical Exam  HENT:      Head: Normocephalic and atraumatic.      Nose: Nose normal.      Mouth/Throat:      Mouth: Mucous membranes are moist.   Eyes: " "     General:         Right eye: No discharge.         Left eye: No discharge.      Extraocular Movements: Extraocular movements intact.      Conjunctiva/sclera: Conjunctivae normal.      Comments:   Bilateral eyes appear to be very moist   Cardiovascular:      Rate and Rhythm: Normal rate and regular rhythm.      Heart sounds: No murmur heard.     No friction rub. No gallop.   Pulmonary:      Effort: No respiratory distress.      Breath sounds: No wheezing, rhonchi or rales.   Abdominal:      General: Abdomen is flat. There is no distension.   Musculoskeletal:         General: No swelling.      Cervical back: Neck supple.   Skin:     General: Skin is warm and dry.      Comments: Erythematous skin lesion of left forearm as seen in picture.  Patient has 2 other skin lesions that are sub-1 cm at the dorsum of the left forearm.  These appear to be dry and slightly raised.   Neurological:      General: No focal deficit present.      Mental Status: He is alert and oriented to person, place, and time.   Psychiatric:         Mood and Affect: Mood normal.         Behavior: Behavior normal.         Thought Content: Thought content normal.         Judgment: Judgment normal.          Physical Exam      Result Review :  The following data was reviewed by: Zurdo Riley DO on 06/10/2025:    No Images in the past 120 days found..     Results  Labs   - Hematocrit: Top end of normal   - Testosterone level: Stable over the last two tests   - Prolactin level: Stable   - Kidney function: Normal   - Liver function: Normal   - Cholesterol levels: Normal   - A1c: 11/2024, 6.1       Procedures          Diagnoses and all orders for this visit:    1. Type 2 diabetes mellitus with hyperlipidemia (Primary)  -     Hemoglobin A1c  -     Insulin Pen Needle (Pen Needles 5/16\") 31G X 8 MM misc; Use as directed with insulin pen Dx E11.9  Dispense: 100 each; Refill: 5    2. Primary hypertension  -     Comprehensive Metabolic Panel  -     " lisinopril (PRINIVIL,ZESTRIL) 40 MG tablet; Take 1 tablet by mouth Daily.  Dispense: 90 tablet; Refill: 1  -     hydroCHLOROthiazide 25 MG tablet; Take 1 tablet by mouth Daily.  Dispense: 90 tablet; Refill: 1  -     amLODIPine (NORVASC) 10 MG tablet; Take 1 tablet by mouth Daily.  Dispense: 90 tablet; Refill: 1    3. Pure hypercholesterolemia  -     Lipid Panel  -     rosuvastatin (CRESTOR) 40 MG tablet; Take 1 tablet by mouth Daily.  Dispense: 90 tablet; Refill: 1    4. Hypogonadism in male  -     Cancel: CBC & Differential  -     Testosterone  -     TSH Rfx On Abnormal To Free T4  -     Prolactin  -     CBC & Differential  -     Xyosted 50 MG/0.5ML solution auto-injector; Inject 50 mg under the skin into the appropriate area as directed 1 (One) Time Per Week.  Dispense: 2 mL; Refill: 2    5. Skin lesion  -     triamcinolone (KENALOG) 0.1 % ointment; Apply 1 Application topically to the appropriate area as directed 2 (Two) Times a Day.  Dispense: 15 g; Refill: 0    6. Allergic conjunctivitis of left eye  -     Discontinue: olopatadine (PATANOL) 0.1 % ophthalmic solution; Administer 1 drop into the left eye 2 (Two) Times a Day.  Dispense: 5 mL; Refill: 0  -     olopatadine (PATANOL) 0.1 % ophthalmic solution; Administer 1 drop into the left eye 2 (Two) Times a Day.  Dispense: 5 mL; Refill: 0    7. Seasonal allergic rhinitis due to pollen    8. Vitamin D deficiency  -     Natural Vitamin D-3 125 MCG (5000 UT) tablet; Take 1 tablet by mouth Daily.  Dispense: 90 tablet; Refill: 1         Assessment & Plan  1. Testosterone supplementation.  - Hematocrit at upper limit of normal.  - Stable testosterone and prolactin levels.  - Normal kidney and liver functions, excellent cholesterol levels, A1c 6.1 seven months ago, satisfactory Vitamin D levels.  - Order comprehensive blood work to monitor prolactin, prostate, testosterone, thyroid levels, CBC, kidney and liver function tests, cholesterol, A1c, thyroid, and prolactin.  Consider temporary cessation of Xyosted if hematocrit exceeds normal range.  -Xyosted was sent to apothecary pharmacy and patient unable to receive prescription  - Contacted [facility] and they received updated prescription although requested physician to change prescription from 6 mL to 2 mL with 2 refills.  - Reordered Xyosted prescription today.  - PDMP reviewed    2. Left eye watering.  - Slightly reddened eyes, no bacterial infection.  - Discussed antihistamine response, left eye more sensitive.  - Most likely allergic conjunctivitis  - Prescription for Pataday for left eye, can be used in both eyes if necessary.    3. Skin lesions.  - Discussed possibility of skin cancer or actinic keratosis.  - Photograph lesion for chart.  - This lesion is itchy which leads me to believe this is more of an atopic dermatitis etiology  - Prescription for triamcinolone ointment, apply twice daily until lesion subsides. Reassess if no improvement in a few weeks.    4. Medication management.  - Refills for lisinopril, hydrochlorothiazide, amlodipine, rosuvastatin, vitamin D tablets, and pen needles (8 mm).    Follow-up in 3 months.              FOLLOW UP  Return in about 3 months (around 9/10/2025) for hypertension, testosterone.  Patient was given instructions and counseling regarding his condition or for health maintenance advice. Please see specific information pulled into the AVS if appropriate.     Patient or patient representative verbalized consent for the use of Ambient Listening during the visit with  Zurdo Riley DO for chart documentation. 6/10/2025  12:53 EDT    Zurdo Riley DO  06/10/25  12:50 EDT    CURRENT & DISCONTINUED MEDICATIONS  Current Outpatient Medications   Medication Instructions    acetaminophen (TYLENOL) 500 MG tablet 2 tablets, Oral, Every 6 Hours PRN    amLODIPine (NORVASC) 10 mg, Oral, Daily    empagliflozin (JARDIANCE) 10 mg, Oral, Daily    fluticasone (FLONASE) 50 MCG/ACT nasal spray 2  "sprays, Nasal, Daily    hydroCHLOROthiazide 25 mg, Oral, Daily    ibuprofen (ADVIL,MOTRIN) 200 MG tablet Take two as needed    Insulin Pen Needle (Pen Needles 5/16\") 31G X 8 MM misc Use as directed with insulin pen Dx E11.9    Liraglutide (VICTOZA) 1.2 mg, Subcutaneous, Daily    lisinopril (PRINIVIL,ZESTRIL) 40 mg, Oral, Daily    Natural Vitamin D-3 125 mcg, Oral, Daily    olopatadine (PATANOL) 0.1 % ophthalmic solution 1 drop, Left Eye, 2 Times Daily    rosuvastatin (CRESTOR) 40 mg, Oral, Daily    triamcinolone (KENALOG) 0.1 % ointment 1 Application, Topical, 2 Times Daily    Xyosted 50 mg, Subcutaneous, Weekly       Medications Discontinued During This Encounter   Medication Reason    predniSONE (DELTASONE) 10 MG (21) dose pack     olopatadine (PATANOL) 0.1 % ophthalmic solution     Insulin Pen Needle 31G X 5 MM misc     Insulin Pen Needle (Pen Needles 5/16\") 31G X 8 MM misc Reorder    amLODIPine (NORVASC) 10 MG tablet Reorder    hydroCHLOROthiazide 25 MG tablet Reorder    lisinopril (PRINIVIL,ZESTRIL) 40 MG tablet Reorder    rosuvastatin (CRESTOR) 40 MG tablet Reorder    Natural Vitamin D-3 125 MCG (5000 UT) tablet Reorder    Xyosted 50 MG/0.5ML solution auto-injector Reorder          "